# Patient Record
Sex: MALE | Race: WHITE | NOT HISPANIC OR LATINO | Employment: FULL TIME | URBAN - METROPOLITAN AREA
[De-identification: names, ages, dates, MRNs, and addresses within clinical notes are randomized per-mention and may not be internally consistent; named-entity substitution may affect disease eponyms.]

---

## 2023-04-11 ENCOUNTER — HOSPITAL ENCOUNTER (OUTPATIENT)
Dept: DATA CONVERSION | Facility: HOSPITAL | Age: 68
End: 2023-04-11
Attending: ORTHOPAEDIC SURGERY | Admitting: ORTHOPAEDIC SURGERY
Payer: MEDICARE

## 2023-04-11 DIAGNOSIS — E78.5 HYPERLIPIDEMIA, UNSPECIFIED: ICD-10-CM

## 2023-04-11 DIAGNOSIS — G56.21 LESION OF ULNAR NERVE, RIGHT UPPER LIMB: ICD-10-CM

## 2023-04-11 DIAGNOSIS — I10 ESSENTIAL (PRIMARY) HYPERTENSION: ICD-10-CM

## 2023-04-11 DIAGNOSIS — G56.01 CARPAL TUNNEL SYNDROME, RIGHT UPPER LIMB: ICD-10-CM

## 2023-09-07 VITALS — BODY MASS INDEX: 27.76 KG/M2 | HEIGHT: 73 IN | WEIGHT: 209.44 LBS

## 2023-09-14 NOTE — PROGRESS NOTES
Service: Orthopaedics     Subjective Data:   JORDAN BRAY is a 67 year old Male who is Hospital Day # 1.    Objective Data:     Objective Information:    ORTHOPEDIC OPERATIVE NOTE    Name: Jordan Bray  : 53  Surgeon: Bruce Patrick DO  Facility: [facility]  Date of Surgery: 23     SURGEON:     Bruce Patrick DO  PRE OP DIAGNOSIS:   Right Carpal and Cubital Tunnel Syndromes  POST OP DIAGNOSIS:   Same  PROCEDURE:    Right Cubital Tunnel Syndrome Decompression with anterior subcutaneous transposition,  right endoscopic carpal tunnel release   ASSISTANT:     SA Torres  ANESTHESIA:   Axillary block with sedation  COMPLICATIONS:    None  BLOOD LOSS: Minimal    INDICATIONS: The patient is a 67-year-old male with signs, symptoms, and clinical history of EMG nerve conduction study consistent with right carpal and cubital tunnel syndrome. The patient failed conservative management. After discussion with the patient  regarding the diagnosis, the treatment options and associated risks and benefits, informed consent was obtained for the above procedure.    PROCEDURE IN DETAIL: The patient was taken to the operative suite and remained supine on the gurney with the right upper extremity outstretched well-padded on arm table. Next, after adequate sedation was induced, time-out was performed to confirm the  patient, the operative site, and the planned procedure. The extremity was then prepped and draped in usual sterile fashion. It was elevated, exsanguinated with an esmarch, and the upper arm tourniquet was inflated to 250 mmHg.     A 1-cm transverse incision was made to the ulnar aspect of the palmaris longus at proximal wrist crease. Skin and underlying tissues were sharply dissected down. Hemostasis maintained with bipolar electrocauterization. Distally based flap of the fascia  overlying the median nerve was then released, and under direct visualization, proximal fascia was released with Martita  scissors. The elevator and dilator were then placed in the carpal tunnel with appropriate ease of passage and corrugated feel of the  undersurface of transcarpal ligament. The endoscope was then placed under direct visualization. The transverse carpal ligament was released in its entirety, confirmed both visually as well as by utilizing endoscope as a probe, which freely passed following  release. The nerve was evaluated. No evidence of lesion or adhesion was present.    A 4-cm curvilinear medial incision was made with a 15-blade through the skin and full-thickness skin flaps were carefully created down to the level of the fascia. The fascia overlying the cubital tunnel was then incised under loupe magnification visualization  and careful blunt dissection was carried out to the ulnar nerve. The ulnar nerve identified and protected throughout the case, and proximal and distal release was performed distally and released the ulnar nerve after the FCU superficial and deep muscle  fascia and proximally was released up through medial intermuscular septum. Of note, the tunnel was significantly constricted at the level of the cubital tunnel and the medial epicondyle. Once the full release was performed, there were no other constricting  or restricting structures on the cubital tunnel. The elbow was then taken through full range of motion and there was subluxation of the ulnar nerve.  At this point a anterior subcutaneous transposition was performed.  The nerve was freed from its surrounding  tissues using littler's scissors.  The medial intermuscular septum was excised with a scalpel.  A soft tissue envelope was developed with a scalpel anteriorly.  The nerve was transposed anteriorly and the soft tissue flap secured with 4-0 vicryl.  The  arm again taken through a range of motion and found to be without constriction and with good gliding.    The area was then copiously irrigated. Tourniquet was let down. Hemostasis was  achieved with bipolar electrocautery as well as direct pressure. The wound was then closed using 4-0 Vicryl deep dermal sutures and  4-0 nylon running stitch for the skin.  The wound edges were dressed with 4x4s, cast padding, and Ace wrap. The patient was then transferred to the recovery room in stable condition.  PLAN: To be discharged home once cleared with Anesthesia. Follow up in 2  weeks for a wound check. All instrument counts, needle counts, sponge counts  were correct at the end of the case.     Electronically signed  Bruce Patrick DO     Assessment and Plan:   Code Status:  ·  Code Status Full Code     Advance Care Planning:  Advance Care Planning: Patient didn't wish to or  was unable to provide advance care plan       Electronic Signatures:  DAPHNE Patrick James ()  (Signed 11-Apr-2023 11:02)   Authored: Service, Subjective Data, Objective Data, Assessment  and Plan, Note Completion      Last Updated: 11-Apr-2023 11:02 by DAPHNE Patrick James ()

## 2023-09-14 NOTE — H&P
History & Physical Reviewed:   I have reviewed the History and Physical dated:  29-Mar-2023   History and Physical reviewed and relevant findings noted. Patient examined to review pertinent physical  findings.: No significant changes   Home Medications Reviewed: no changes noted   Allergies Reviewed: no changes noted       ERAS (Enhanced Recovery After Surgery):  ·  ERAS Patient: no     Consent:   COVID-19 Consent:  ·  COVID-19 Risk Consent Surgeon has reviewed key risks related to the risk of сергей COVID-19 and if they contract COVID-19 what the risks are.       Electronic Signatures:  DAPHNE Patrick James ()  (Signed 11-Apr-2023 09:28)   Authored: History & Physical Reviewed, ERAS, Consent,  Note Completion      Last Updated: 11-Apr-2023 09:28 by DAPHNE Patrick James ()

## 2023-10-06 ENCOUNTER — OFFICE VISIT (OUTPATIENT)
Dept: PRIMARY CARE | Facility: CLINIC | Age: 68
End: 2023-10-06
Payer: MEDICARE

## 2023-10-06 VITALS
DIASTOLIC BLOOD PRESSURE: 80 MMHG | HEART RATE: 68 BPM | SYSTOLIC BLOOD PRESSURE: 132 MMHG | OXYGEN SATURATION: 97 % | TEMPERATURE: 97.2 F

## 2023-10-06 DIAGNOSIS — H69.93 DYSFUNCTION OF BOTH EUSTACHIAN TUBES: ICD-10-CM

## 2023-10-06 DIAGNOSIS — J01.90 ACUTE NON-RECURRENT SINUSITIS, UNSPECIFIED LOCATION: Primary | ICD-10-CM

## 2023-10-06 PROCEDURE — 1036F TOBACCO NON-USER: CPT | Performed by: PHYSICIAN ASSISTANT

## 2023-10-06 PROCEDURE — 1160F RVW MEDS BY RX/DR IN RCRD: CPT | Performed by: PHYSICIAN ASSISTANT

## 2023-10-06 PROCEDURE — 99214 OFFICE O/P EST MOD 30 MIN: CPT | Performed by: PHYSICIAN ASSISTANT

## 2023-10-06 PROCEDURE — 1159F MED LIST DOCD IN RCRD: CPT | Performed by: PHYSICIAN ASSISTANT

## 2023-10-06 RX ORDER — FLUTICASONE PROPIONATE 50 MCG
2 SPRAY, SUSPENSION (ML) NASAL DAILY
Qty: 16 G | Refills: 0 | Status: SHIPPED | OUTPATIENT
Start: 2023-10-06 | End: 2024-10-05

## 2023-10-06 RX ORDER — AMOXICILLIN 500 MG/1
1000 CAPSULE ORAL 2 TIMES DAILY
Qty: 40 CAPSULE | Refills: 0 | Status: SHIPPED | OUTPATIENT
Start: 2023-10-06 | End: 2023-10-19 | Stop reason: ALTCHOICE

## 2023-10-06 RX ORDER — SIMVASTATIN 20 MG/1
20 TABLET, FILM COATED ORAL NIGHTLY
COMMUNITY
End: 2023-11-01 | Stop reason: SDUPTHER

## 2023-10-06 ASSESSMENT — ENCOUNTER SYMPTOMS
PALPITATIONS: 0
SHORTNESS OF BREATH: 0
ABDOMINAL PAIN: 0

## 2023-10-06 NOTE — PROGRESS NOTES
Subjective   Patient ID: Jordan Bray is a 68 y.o. male who presents for Cough (Sinus congestion, ears aching x 2 wks).    HPI   Patient c/o cough, nasal discharge or sinus pain. Denies fever, shortness of breath and sore throat.  Taking over the counter medications. Present for 2 weeks. Has worsened since onset.   Cough: Cough is productive of greenish sputum.   Nasal discharge: Described as purulent.  Has PND.   Sinus pain: Experiencing frontal pain.   Denies associated diarrhea and vomiting.  Getting some ear pressure.   No fevers/chills or aches.   Patient denies recent known COVID exposure or international travel.     Trying to do nasal lavage and some sudafed.     Hasn't gotten COVID vaccine.      reports that he has never smoked. He has never used smokeless tobacco.    Review of Systems   Respiratory:  Negative for shortness of breath.    Cardiovascular:  Negative for chest pain and palpitations.   Gastrointestinal:  Negative for abdominal pain.       Objective   /80   Pulse 68   Temp 36.2 °C (97.2 °F)   SpO2 97%     Physical Exam  Constitutional:       Appearance: Normal appearance.   HENT:      Head: Normocephalic.      Right Ear: Ear canal normal.      Left Ear: Ear canal normal.      Ears:      Comments: Increased fluid behind TMs without erythema.     Nose: Congestion present.      Right Sinus: Frontal sinus tenderness present.      Left Sinus: Frontal sinus tenderness present.      Mouth/Throat:      Mouth: Mucous membranes are moist.      Pharynx: No oropharyngeal exudate or posterior oropharyngeal erythema.   Eyes:      General: No scleral icterus.  Cardiovascular:      Rate and Rhythm: Normal rate and regular rhythm.   Pulmonary:      Effort: Pulmonary effort is normal.      Breath sounds: Normal breath sounds.   Lymphadenopathy:      Cervical: No cervical adenopathy.   Skin:     General: Skin is warm and dry.   Neurological:      Mental Status: He is alert.         Assessment/Plan    Diagnoses and all orders for this visit:  Acute non-recurrent sinusitis, unspecified location  -     amoxicillin (Amoxil) 500 mg capsule; Take 2 capsules (1,000 mg) by mouth 2 times a day for 10 days.  -     fluticasone (Flonase) 50 mcg/actuation nasal spray; Administer 2 sprays into each nostril once daily.  Dysfunction of both eustachian tubes  -     fluticasone (Flonase) 50 mcg/actuation nasal spray; Administer 2 sprays into each nostril once daily.        Rx Amoxicillin and Flonase.   Can use Mucinex DM.   Encourage fluids and rest.   Follow up if symptoms increase or persist.

## 2023-10-16 ENCOUNTER — TELEPHONE (OUTPATIENT)
Dept: PRIMARY CARE | Facility: CLINIC | Age: 68
End: 2023-10-16
Payer: MEDICARE

## 2023-10-16 NOTE — TELEPHONE ENCOUNTER
Pt called rx line at 1058a requesting a refill on amox, pt is still having earache.    Pt was seen by [facility] on 10/6 and was informed per note to followup if sx's persist.    Please call pt to schedule a follow up appt [facility]

## 2023-10-19 ENCOUNTER — OFFICE VISIT (OUTPATIENT)
Dept: PRIMARY CARE | Facility: CLINIC | Age: 68
End: 2023-10-19
Payer: MEDICARE

## 2023-10-19 VITALS
SYSTOLIC BLOOD PRESSURE: 132 MMHG | OXYGEN SATURATION: 95 % | DIASTOLIC BLOOD PRESSURE: 80 MMHG | HEART RATE: 78 BPM | TEMPERATURE: 97.8 F

## 2023-10-19 DIAGNOSIS — J30.2 SEASONAL ALLERGIC RHINITIS, UNSPECIFIED TRIGGER: Primary | ICD-10-CM

## 2023-10-19 PROBLEM — D48.5 NEOPLASM OF UNCERTAIN BEHAVIOR OF SKIN: Status: ACTIVE | Noted: 2023-06-27

## 2023-10-19 PROBLEM — I10 ESSENTIAL HYPERTENSION: Status: ACTIVE | Noted: 2023-10-19

## 2023-10-19 PROCEDURE — 1160F RVW MEDS BY RX/DR IN RCRD: CPT | Performed by: PHYSICIAN ASSISTANT

## 2023-10-19 PROCEDURE — 3079F DIAST BP 80-89 MM HG: CPT | Performed by: PHYSICIAN ASSISTANT

## 2023-10-19 PROCEDURE — 3075F SYST BP GE 130 - 139MM HG: CPT | Performed by: PHYSICIAN ASSISTANT

## 2023-10-19 PROCEDURE — 1159F MED LIST DOCD IN RCRD: CPT | Performed by: PHYSICIAN ASSISTANT

## 2023-10-19 PROCEDURE — 1036F TOBACCO NON-USER: CPT | Performed by: PHYSICIAN ASSISTANT

## 2023-10-19 PROCEDURE — 99213 OFFICE O/P EST LOW 20 MIN: CPT | Performed by: PHYSICIAN ASSISTANT

## 2023-10-19 NOTE — PROGRESS NOTES
Subjective   Patient ID: Jordan Bray is a 68 y.o. male who presents for Sinusitis (Recheck).    HPI   Patient presents for recheck of sinus symptoms.  Was diagnosed with sinusitis and EGD last visit on 10/6/2023.  Was placed on amoxicillin.  Finished the amoxicillin a few days ago and feeling much improved but still was having a little bit of postnasal drainage past couple days but that does seem to be better today.  Ears are also feeling better.  Used Flonase as well but no longer taking it.  States he is frustrated with recurrent sinus issues that he gets different times of the year. Worse in spring and fall and some in the winter.   Get sneezy at times and mild itchy nose. Gets runny nose.   Has dealt with this for years.    Review of Systems    Objective   /80   Pulse 78   Temp 36.6 °C (97.8 °F)   SpO2 95%     Physical Exam  Vitals and nursing note reviewed.   Constitutional:       Appearance: Normal appearance.   HENT:      Head: Normocephalic.      Right Ear: Tympanic membrane, ear canal and external ear normal.      Left Ear: Tympanic membrane, ear canal and external ear normal.      Nose: No congestion.      Right Sinus: No maxillary sinus tenderness or frontal sinus tenderness.      Left Sinus: No maxillary sinus tenderness or frontal sinus tenderness.      Mouth/Throat:      Mouth: Mucous membranes are moist.      Pharynx: No oropharyngeal exudate or posterior oropharyngeal erythema.   Eyes:      General: No scleral icterus.  Cardiovascular:      Rate and Rhythm: Normal rate and regular rhythm.   Pulmonary:      Effort: Pulmonary effort is normal.      Breath sounds: Normal breath sounds.   Lymphadenopathy:      Cervical: No cervical adenopathy.   Skin:     General: Skin is warm and dry.   Neurological:      Mental Status: He is alert.       Assessment/Plan   Diagnoses and all orders for this visit:  Seasonal allergic rhinitis, unspecified trigger  -     Referral to Allergy; Future     Suspect  underlying seasonal allergies.  Recommended that he use OTC Claritin or Zyrtec and can even add on Flonase OTC as needed.  If not improving can schedule with allergist for further evaluation and did referral for this.  Follow-up if symptoms significant increase or persist.

## 2023-10-26 ENCOUNTER — LAB (OUTPATIENT)
Dept: LAB | Facility: LAB | Age: 68
End: 2023-10-26
Payer: MEDICARE

## 2023-10-26 DIAGNOSIS — E78.5 HYPERLIPIDEMIA, UNSPECIFIED: Primary | ICD-10-CM

## 2023-10-26 DIAGNOSIS — N40.1 BENIGN PROSTATIC HYPERPLASIA WITH LOWER URINARY TRACT SYMPTOMS: ICD-10-CM

## 2023-10-26 LAB
ALBUMIN SERPL BCP-MCNC: 4.1 G/DL (ref 3.4–5)
ALP SERPL-CCNC: 77 U/L (ref 33–136)
ALT SERPL W P-5'-P-CCNC: 26 U/L (ref 10–52)
ANION GAP SERPL CALC-SCNC: 9 MMOL/L (ref 10–20)
AST SERPL W P-5'-P-CCNC: 31 U/L (ref 9–39)
BILIRUB SERPL-MCNC: 0.5 MG/DL (ref 0–1.2)
BUN SERPL-MCNC: 28 MG/DL (ref 6–23)
CALCIUM SERPL-MCNC: 9.3 MG/DL (ref 8.6–10.3)
CHLORIDE SERPL-SCNC: 109 MMOL/L (ref 98–107)
CHOLEST SERPL-MCNC: 137 MG/DL (ref 0–199)
CHOLESTEROL/HDL RATIO: 2.6
CO2 SERPL-SCNC: 26 MMOL/L (ref 21–32)
CREAT SERPL-MCNC: 0.86 MG/DL (ref 0.5–1.3)
GFR SERPL CREATININE-BSD FRML MDRD: >90 ML/MIN/1.73M*2
GLUCOSE SERPL-MCNC: 109 MG/DL (ref 74–99)
HDLC SERPL-MCNC: 53 MG/DL
LDLC SERPL CALC-MCNC: 70 MG/DL
NON HDL CHOLESTEROL: 84 MG/DL (ref 0–149)
POTASSIUM SERPL-SCNC: 4.7 MMOL/L (ref 3.5–5.3)
PROT SERPL-MCNC: 6 G/DL (ref 6.4–8.2)
PSA SERPL-MCNC: 0.54 NG/ML
SODIUM SERPL-SCNC: 139 MMOL/L (ref 136–145)
TRIGL SERPL-MCNC: 69 MG/DL (ref 0–149)
VLDL: 14 MG/DL (ref 0–40)

## 2023-10-26 PROCEDURE — 80061 LIPID PANEL: CPT

## 2023-10-26 PROCEDURE — 36415 COLL VENOUS BLD VENIPUNCTURE: CPT

## 2023-10-26 PROCEDURE — 84153 ASSAY OF PSA TOTAL: CPT

## 2023-10-26 PROCEDURE — 80053 COMPREHEN METABOLIC PANEL: CPT

## 2023-11-01 ENCOUNTER — OFFICE VISIT (OUTPATIENT)
Dept: PRIMARY CARE | Facility: CLINIC | Age: 68
End: 2023-11-01
Payer: MEDICARE

## 2023-11-01 VITALS
HEIGHT: 72 IN | BODY MASS INDEX: 28.71 KG/M2 | SYSTOLIC BLOOD PRESSURE: 126 MMHG | DIASTOLIC BLOOD PRESSURE: 78 MMHG | WEIGHT: 212 LBS | OXYGEN SATURATION: 96 % | TEMPERATURE: 97.2 F | HEART RATE: 70 BPM

## 2023-11-01 DIAGNOSIS — Z12.5 SCREENING FOR PROSTATE CANCER: ICD-10-CM

## 2023-11-01 DIAGNOSIS — Z00.00 ROUTINE GENERAL MEDICAL EXAMINATION AT HEALTH CARE FACILITY: Primary | ICD-10-CM

## 2023-11-01 DIAGNOSIS — H69.92 DYSFUNCTION OF LEFT EUSTACHIAN TUBE: ICD-10-CM

## 2023-11-01 DIAGNOSIS — R73.01 IFG (IMPAIRED FASTING GLUCOSE): ICD-10-CM

## 2023-11-01 DIAGNOSIS — E78.2 MIXED HYPERLIPIDEMIA: ICD-10-CM

## 2023-11-01 PROCEDURE — G0439 PPPS, SUBSEQ VISIT: HCPCS | Performed by: FAMILY MEDICINE

## 2023-11-01 PROCEDURE — 1170F FXNL STATUS ASSESSED: CPT | Performed by: FAMILY MEDICINE

## 2023-11-01 PROCEDURE — 1160F RVW MEDS BY RX/DR IN RCRD: CPT | Performed by: FAMILY MEDICINE

## 2023-11-01 PROCEDURE — 1159F MED LIST DOCD IN RCRD: CPT | Performed by: FAMILY MEDICINE

## 2023-11-01 PROCEDURE — 99213 OFFICE O/P EST LOW 20 MIN: CPT | Performed by: FAMILY MEDICINE

## 2023-11-01 PROCEDURE — 3078F DIAST BP <80 MM HG: CPT | Performed by: FAMILY MEDICINE

## 2023-11-01 PROCEDURE — 1036F TOBACCO NON-USER: CPT | Performed by: FAMILY MEDICINE

## 2023-11-01 PROCEDURE — 3074F SYST BP LT 130 MM HG: CPT | Performed by: FAMILY MEDICINE

## 2023-11-01 RX ORDER — AZELASTINE 1 MG/ML
2 SPRAY, METERED NASAL 2 TIMES DAILY
Qty: 30 ML | Refills: 1 | Status: SHIPPED | OUTPATIENT
Start: 2023-11-01

## 2023-11-01 RX ORDER — SIMVASTATIN 20 MG/1
20 TABLET, FILM COATED ORAL NIGHTLY
Qty: 90 TABLET | Refills: 3 | Status: SHIPPED | OUTPATIENT
Start: 2023-11-01

## 2023-11-01 ASSESSMENT — PATIENT HEALTH QUESTIONNAIRE - PHQ9
1. LITTLE INTEREST OR PLEASURE IN DOING THINGS: NOT AT ALL
SUM OF ALL RESPONSES TO PHQ9 QUESTIONS 1 AND 2: 0
2. FEELING DOWN, DEPRESSED OR HOPELESS: NOT AT ALL

## 2023-11-01 ASSESSMENT — ACTIVITIES OF DAILY LIVING (ADL)
GROCERY_SHOPPING: INDEPENDENT
DOING_HOUSEWORK: INDEPENDENT
BATHING: INDEPENDENT
DRESSING: INDEPENDENT
TAKING_MEDICATION: INDEPENDENT
MANAGING_FINANCES: INDEPENDENT

## 2023-11-01 NOTE — PROGRESS NOTES
Subjective   Reason for Visit: Jordan Bray is an 68 y.o. male here for a Medicare Wellness visit.          Reviewed all medications by prescribing practitioner or clinical pharmacist (such as prescriptions, OTCs, herbal therapies and supplements) and documented in the medical record.    HPI    HPI: Annual Wellness visit. The patient has not felt depressed over the past 6 weeks. No trouble with bathing, dressing, eating, grooming, walking, toileting, house work, managing money or transportation. Has fallen 1-2 times. The home has functioning smoke alarms, grab bars in the bathroom and handrails on the stairs. The home does not have poor lighting. Denies hearing difficulty in the ears bilaterally. No diet restrictions.     Sinusitis: congestion, LGF, cough this week, started Friday.      lipids: well controlled on simvastatin.    Preparing meals - independent  Using telephone - independent  Bladder - continent  Bowel - continent    Recent hospital stays -     ADLs - able to dress, walk and bath independently  Instrumental ADL's - able to shop, maintain finances, managing medications, housekeeping independently    Depression: PHQ-2 (screening 2 mins) - negative    Opioid use -   none  Exercise -  regularly  Diet - well balanced  Pain score - none    Providers    [employer] dementia screen - 5/5    Education - educated pt on healthy eating, exercise, weight loss    Falls - 0        MiniCO/5    Counseled pt on living will: printed out sheet    AAA screening:  NA     Prostate CA screen:  PSA was normal     CV screening: stress negative last year    Colonoscopy: due     Diabetes screening:  normal    Glaucoma screen:  sees optho regularly    CT chest tob screen: NA    Vaccines:  giving flu shot and pneumonia shot    Patient Care Team:  Aldo Connolly MD as PCP - General  Aldo Connolly MD as PCP - [facility] Attributed [facility]     Review of Systems   All other systems reviewed and are negative.      Objective    Vitals:  /78   Pulse 70   Temp 36.2 °C (97.2 °F)   Ht 1.829 m (6')   Wt 96.2 kg (212 lb)   SpO2 96%   BMI 28.75 kg/m²       Physical Exam  Vitals reviewed.   Constitutional:       General: He is not in acute distress.     Appearance: Normal appearance. He is well-developed. He is not diaphoretic.   HENT:      Head: Normocephalic and atraumatic.      Right Ear: Tympanic membrane normal.      Left Ear: Tympanic membrane normal.      Nose: Nose normal.      Mouth/Throat:      Mouth: Mucous membranes are moist.   Eyes:      Pupils: Pupils are equal, round, and reactive to light.   Cardiovascular:      Rate and Rhythm: Normal rate and regular rhythm.      Heart sounds: Normal heart sounds. No murmur heard.     No friction rub. No gallop.   Pulmonary:      Effort: Pulmonary effort is normal.      Breath sounds: Normal breath sounds. No rales.   Abdominal:      General: Bowel sounds are normal.      Palpations: Abdomen is soft.      Tenderness: There is no abdominal tenderness.   Musculoskeletal:      Cervical back: Normal range of motion and neck supple.   Skin:     General: Skin is warm and dry.   Neurological:      Mental Status: He is alert.   Psychiatric:         Mood and Affect: Mood normal.         Assessment/Plan   Problem List Items Addressed This Visit       Hyperlipidemia    Overview     Note: ak         Relevant Medications    simvastatin (Zocor) 20 mg tablet     Other Visit Diagnoses       Routine general medical examination at health care facility    -  Primary    IFG (impaired fasting glucose)        Relevant Orders    Hemoglobin A1C    Dysfunction of left eustachian tube        Relevant Medications    azelastine (Astelin) 137 mcg (0.1 %) nasal spray            Try astelin and flonase for ETD.  Checking A1c to look at BS.  Aim for 10-15 lbs of weight loss.  Fu in 12 mo

## 2023-11-02 ENCOUNTER — APPOINTMENT (OUTPATIENT)
Dept: LAB | Facility: LAB | Age: 68
End: 2023-11-02
Payer: MEDICARE

## 2023-11-09 ENCOUNTER — LAB (OUTPATIENT)
Dept: LAB | Facility: LAB | Age: 68
End: 2023-11-09
Payer: MEDICARE

## 2023-11-09 ENCOUNTER — TELEPHONE (OUTPATIENT)
Dept: PRIMARY CARE | Facility: CLINIC | Age: 68
End: 2023-11-09

## 2023-11-09 DIAGNOSIS — R73.01 IFG (IMPAIRED FASTING GLUCOSE): ICD-10-CM

## 2023-11-09 LAB
EST. AVERAGE GLUCOSE BLD GHB EST-MCNC: 123 MG/DL
HBA1C MFR BLD: 5.9 %

## 2023-11-09 PROCEDURE — 36415 COLL VENOUS BLD VENIPUNCTURE: CPT

## 2023-11-09 PROCEDURE — 83036 HEMOGLOBIN GLYCOSYLATED A1C: CPT

## 2023-11-09 NOTE — TELEPHONE ENCOUNTER
----- Message from Aldo Connolly MD sent at 11/9/2023  1:17 PM EST -----  Patient's A1c was 5.9.  Cutoff for diabetes is 6.5 so he is prediabetic

## 2024-08-22 ENCOUNTER — APPOINTMENT (OUTPATIENT)
Dept: PRIMARY CARE | Facility: CLINIC | Age: 69
End: 2024-08-22
Payer: MEDICARE

## 2024-08-22 VITALS
BODY MASS INDEX: 29.43 KG/M2 | SYSTOLIC BLOOD PRESSURE: 122 MMHG | TEMPERATURE: 97.7 F | HEART RATE: 69 BPM | WEIGHT: 217 LBS | OXYGEN SATURATION: 98 % | DIASTOLIC BLOOD PRESSURE: 82 MMHG

## 2024-08-22 DIAGNOSIS — G89.29 CHRONIC PAIN OF BOTH KNEES: Primary | ICD-10-CM

## 2024-08-22 DIAGNOSIS — M54.50 BILATERAL LOW BACK PAIN, UNSPECIFIED CHRONICITY, UNSPECIFIED WHETHER SCIATICA PRESENT: ICD-10-CM

## 2024-08-22 DIAGNOSIS — M25.562 CHRONIC PAIN OF BOTH KNEES: Primary | ICD-10-CM

## 2024-08-22 DIAGNOSIS — M25.561 CHRONIC PAIN OF BOTH KNEES: Primary | ICD-10-CM

## 2024-08-22 PROCEDURE — 99214 OFFICE O/P EST MOD 30 MIN: CPT | Performed by: FAMILY MEDICINE

## 2024-08-22 PROCEDURE — 3074F SYST BP LT 130 MM HG: CPT | Performed by: FAMILY MEDICINE

## 2024-08-22 PROCEDURE — 3079F DIAST BP 80-89 MM HG: CPT | Performed by: FAMILY MEDICINE

## 2024-08-22 RX ORDER — TIMOLOL MALEATE 5 MG/ML
1 SOLUTION/ DROPS OPHTHALMIC DAILY
COMMUNITY
Start: 2024-05-23

## 2024-08-22 RX ORDER — NORTRIPTYLINE HYDROCHLORIDE 25 MG/1
25 CAPSULE ORAL NIGHTLY
Qty: 90 CAPSULE | Refills: 1 | Status: SHIPPED | OUTPATIENT
Start: 2024-08-22 | End: 2025-02-18

## 2024-08-22 ASSESSMENT — ENCOUNTER SYMPTOMS: LEG PAIN: 1

## 2024-08-22 NOTE — PROGRESS NOTES
Subjective   Patient ID: Jordan Bray is a 69 y.o. male who presents for Leg Pain (Discuss tight feeling in bilat legs, R knee pain, low back pain x12 years- getting progressively worse. ).  Leg Pain      patient presents with pain in the back of his knees particularly in his right for some time now.  He had some trauma to his leg around 2012.  Now it is difficult for her to walk around and stand up from sitting.  He is not interested in knee replacement.  He also has been having pain in his lower back which has been getting worse over the past 12 years.    Patient Active Problem List   Diagnosis    Essential hypertension    Hyperlipidemia    Neoplasm of uncertain behavior of skin    Obstructive sleep apnea syndrome       Social Connections: Not on file       Current Outpatient Medications on File Prior to Visit   Medication Sig Dispense Refill    azelastine (Astelin) 137 mcg (0.1 %) nasal spray Administer 2 sprays into each nostril 2 times a day. Use in each nostril as directed 30 mL 1    fluticasone (Flonase) 50 mcg/actuation nasal spray Administer 2 sprays into each nostril once daily. 16 g 0    simvastatin (Zocor) 20 mg tablet Take 1 tablet (20 mg) by mouth once daily at bedtime. 90 tablet 3    timolol (Timoptic) 0.5 % ophthalmic solution Administer 1 drop into the right eye once daily.       No current facility-administered medications on file prior to visit.        Vitals:    08/22/24 0837   BP: 122/82   Pulse: 69   Temp: 36.5 °C (97.7 °F)   SpO2: 98%     Vitals:    08/22/24 0837   Weight: 98.4 kg (217 lb)       Review of Systems   All other systems reviewed and are negative.      Objective     Physical Exam  Constitutional:       Appearance: Normal appearance. He is well-developed.   HENT:      Head: Atraumatic.   Cardiovascular:      Rate and Rhythm: Normal rate and regular rhythm.      Heart sounds: Normal heart sounds. No murmur heard.  Pulmonary:      Effort: Pulmonary effort is normal.      Breath  sounds: Normal breath sounds.   Abdominal:      General: Bowel sounds are normal.      Palpations: Abdomen is soft.   Musculoskeletal:      Comments: Positive Thessaly test on left knee.  Tenderness to palpation at the posterior knee bilaterally.   Skin:     General: Skin is warm.   Neurological:      General: No focal deficit present.      Mental Status: He is alert.   Psychiatric:         Mood and Affect: Mood normal.         No visits with results within 2 Month(s) from this visit.   Latest known visit with results is:   Lab on 11/09/2023   Component Date Value Ref Range Status    Hemoglobin A1C 11/09/2023 5.9 (H)  see below % Final    Estimated Average Glucose 11/09/2023 123  Not Established mg/dL Final       Assessment/Plan   Problem List Items Addressed This Visit    None  Visit Diagnoses         Codes    Chronic pain of both knees    -  Primary M25.561, M25.562, G89.29    Relevant Medications    nortriptyline (Pamelor) 25 mg capsule    Other Relevant Orders    XR knee 4+ views bilateral    Referral to Physical Therapy    MR knee left wo IV contrast    Bilateral low back pain, unspecified chronicity, unspecified whether sciatica present     M54.50    Relevant Medications    nortriptyline (Pamelor) 25 mg capsule    Other Relevant Orders    Referral to Physical Therapy        Ordering physical therapy for patient.  Ordering x-rays and MRI of left knee.

## 2024-08-26 ENCOUNTER — HOSPITAL ENCOUNTER (OUTPATIENT)
Dept: RADIOLOGY | Facility: CLINIC | Age: 69
Discharge: HOME | End: 2024-08-26
Payer: MEDICARE

## 2024-08-26 DIAGNOSIS — M25.562 CHRONIC PAIN OF BOTH KNEES: ICD-10-CM

## 2024-08-26 DIAGNOSIS — G89.29 CHRONIC PAIN OF BOTH KNEES: ICD-10-CM

## 2024-08-26 DIAGNOSIS — S83.249A ACUTE MEDIAL MENISCAL TEAR, UNSPECIFIED LATERALITY, INITIAL ENCOUNTER: Primary | ICD-10-CM

## 2024-08-26 DIAGNOSIS — M25.561 CHRONIC PAIN OF BOTH KNEES: ICD-10-CM

## 2024-08-26 PROCEDURE — 73564 X-RAY EXAM KNEE 4 OR MORE: CPT | Mod: 50

## 2024-08-26 PROCEDURE — 73721 MRI JNT OF LWR EXTRE W/O DYE: CPT | Mod: LEFT SIDE | Performed by: RADIOLOGY

## 2024-08-26 PROCEDURE — 73564 X-RAY EXAM KNEE 4 OR MORE: CPT | Mod: BILATERAL PROCEDURE | Performed by: RADIOLOGY

## 2024-08-26 PROCEDURE — 73721 MRI JNT OF LWR EXTRE W/O DYE: CPT | Mod: LT

## 2024-08-27 ENCOUNTER — TELEPHONE (OUTPATIENT)
Dept: PRIMARY CARE | Facility: CLINIC | Age: 69
End: 2024-08-27
Payer: MEDICARE

## 2024-08-27 NOTE — RESULT ENCOUNTER NOTE
Please let pt know his MRI showed a complex meniscus tear and possibly some ACL tear as well.  I'm going to refer him to Dr. Hernández to help to take care of him.

## 2024-08-27 NOTE — TELEPHONE ENCOUNTER
----- Message from Aldo Connolly sent at 8/26/2024 10:02 PM EDT -----  Please let pt know his MRI showed a complex meniscus tear and possibly some ACL tear as well.  I'm going to refer him to Dr. Hernández to help to take care of him.

## 2024-08-29 ENCOUNTER — TELEPHONE (OUTPATIENT)
Dept: PRIMARY CARE | Facility: CLINIC | Age: 69
End: 2024-08-29
Payer: MEDICARE

## 2024-08-29 NOTE — TELEPHONE ENCOUNTER
----- Message from Aldo Connolly sent at 8/29/2024  8:36 AM EDT -----  Xray shows B severe knee OA.  I like Dr. Hernández for replacements

## 2024-09-10 ENCOUNTER — APPOINTMENT (OUTPATIENT)
Dept: ORTHOPEDIC SURGERY | Facility: CLINIC | Age: 69
End: 2024-09-10
Payer: MEDICARE

## 2024-09-10 VITALS — WEIGHT: 217 LBS | HEIGHT: 72 IN | BODY MASS INDEX: 29.39 KG/M2

## 2024-09-10 DIAGNOSIS — S83.249A ACUTE MEDIAL MENISCAL TEAR, UNSPECIFIED LATERALITY, INITIAL ENCOUNTER: ICD-10-CM

## 2024-09-10 DIAGNOSIS — M17.0 PRIMARY OSTEOARTHRITIS OF BOTH KNEES: ICD-10-CM

## 2024-09-10 PROCEDURE — 1159F MED LIST DOCD IN RCRD: CPT | Performed by: ORTHOPAEDIC SURGERY

## 2024-09-10 PROCEDURE — 20610 DRAIN/INJ JOINT/BURSA W/O US: CPT | Performed by: ORTHOPAEDIC SURGERY

## 2024-09-10 PROCEDURE — 1036F TOBACCO NON-USER: CPT | Performed by: ORTHOPAEDIC SURGERY

## 2024-09-10 PROCEDURE — 99214 OFFICE O/P EST MOD 30 MIN: CPT | Performed by: ORTHOPAEDIC SURGERY

## 2024-09-10 PROCEDURE — 3008F BODY MASS INDEX DOCD: CPT | Performed by: ORTHOPAEDIC SURGERY

## 2024-09-10 RX ORDER — TRIAMCINOLONE ACETONIDE 40 MG/ML
80 INJECTION, SUSPENSION INTRA-ARTICULAR; INTRAMUSCULAR
Status: COMPLETED | OUTPATIENT
Start: 2024-09-10 | End: 2024-09-10

## 2024-09-10 ASSESSMENT — PAIN - FUNCTIONAL ASSESSMENT: PAIN_FUNCTIONAL_ASSESSMENT: 0-10

## 2024-09-10 ASSESSMENT — PAIN SCALES - GENERAL: PAINLEVEL_OUTOF10: 0 - NO PAIN
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
## 2024-09-13 ENCOUNTER — EVALUATION (OUTPATIENT)
Dept: PHYSICAL THERAPY | Facility: CLINIC | Age: 69
End: 2024-09-13
Payer: MEDICARE

## 2024-09-13 DIAGNOSIS — M17.0 PRIMARY OSTEOARTHRITIS OF BOTH KNEES: Primary | ICD-10-CM

## 2024-09-13 PROCEDURE — 97110 THERAPEUTIC EXERCISES: CPT | Mod: GP | Performed by: PHYSICAL THERAPIST

## 2024-09-13 PROCEDURE — 97161 PT EVAL LOW COMPLEX 20 MIN: CPT | Mod: GP | Performed by: PHYSICAL THERAPIST

## 2024-09-13 ASSESSMENT — PAIN SCALES - GENERAL: PAINLEVEL_OUTOF10: 1

## 2024-09-13 ASSESSMENT — PATIENT HEALTH QUESTIONNAIRE - PHQ9
SUM OF ALL RESPONSES TO PHQ9 QUESTIONS 1 AND 2: 0
1. LITTLE INTEREST OR PLEASURE IN DOING THINGS: NOT AT ALL
2. FEELING DOWN, DEPRESSED OR HOPELESS: NOT AT ALL

## 2024-09-13 ASSESSMENT — ENCOUNTER SYMPTOMS
LOSS OF SENSATION IN FEET: 0
DEPRESSION: 0
OCCASIONAL FEELINGS OF UNSTEADINESS: 0

## 2024-09-13 ASSESSMENT — PAIN - FUNCTIONAL ASSESSMENT: PAIN_FUNCTIONAL_ASSESSMENT: 0-10

## 2024-09-13 NOTE — PROGRESS NOTES
Physical Therapy    Physical Therapy Lower Extremity Evaluation    Patient Name: Jordan Bray  MRN: [id number]  : 1955   Today's Date: 2024  Time Calculation  Start Time: 0200  Stop Time: 0245  Time Calculation (min): 45 min  PT Evaluation Time Entry  PT Evaluation (Low) Time Entry: 25  PT Therapeutic Procedures Time Entry  Therapeutic Exercise Time Entry: 10        Non-Billable Time  Non-billable time: 10  Non-billable time reason: CP    Visit: 1  Auth: MN    Current Problem  Problem List Items Addressed This Visit             ICD-10-CM    Primary osteoarthritis of both knees - Primary M17.0    Relevant Orders    Follow Up In Physical Therapy        General  Name and  confirmed with patient on this date.           Precautions  Precautions  STEADI Fall Risk Score (The score of 4 or more indicates an increased risk of falling): 0  Precautions Comment: none       Pain  Pain Assessment: 0-10  0-10 (Numeric) Pain Score: 1  Pain Location: Knee  Pain Orientation:  (bilat.)    SUBJECTIVE:   Chief complaint:  Pt is a 70 yo male with complaint of bilat knee pain over the past 2+ years, right > left. Pt reports that in  he was at work and jumped off of a ladder and landed with his legs straight. Pt had cortisone injection in bilat knees on 9/10/24.Pt reports that he received significant relief from injections.    Pain Better: tumeric  Pain Worse: twisting knee, prolonged walking and standing, stairs,squatting, kneeling  Imaging: severe right knee PF arthritis and moderate arthritis in medial compartment. Left knee with severe medial compartment arthritis.  Prior level of function:   Current limitations:   Home setup: one story with basement  Work: Retired  Patients goal: Help knees move better    OBJECTIVE:    Lower Extremity Strength: (5/5 unless noted)  MMT RIGHT LEFT   Hip Flexion     Hip Extension     Hip Abduction     Hip Adduction     Knee Extension 4+/5 4+/5   Knee Flexion 4+/5 4+/5   Ankle DF    "  Ankle PF     Ankle INV     Ankle EV       Lower Extremity ROM: WNL unless documented below:  ROM in Degrees  RIGHT LEFT   Hip Flexion     Hip Extension     Hip Abduction     Hip Adduction     Knee Extension -12 from neutral -10 from neutral   Knee Flexion 110 110   Ankle DF     Ankle PF     Ankle INV     Ankle EV       Joint mobility: decreased patellar mobility bilat  Posture: maintains bilat knees in flexed posture  Gait mechanics: Pt ambulates with bilat knees in flexed position throughout gait.  Palpation: tender around patella and posterior knee bilat  Neurological symptoms: none    Special tests:  KNEE RIGHT LEFT   Mckenna's positive positive   Apley's     Joint Line Tenderness RIGHT LEFT   Anterior Drawer     Posterior Drawer     Valgus stress     Varus Stress         Functional Outcome:   Other Measures  Lower Extremity Funtional Score (LEFS): 24    TREATMENT:  EXERCISES Date 9/13/24 Date Date Date    REPS REPS REPS REPS          Nustep L4     8 mins      Quad set 5\"H X 10      Heel slide 10X      Shuttle  DLP 5B  3 X 10      Shuttle SLP 3B  3 X 10      Shuttle TR/HR              Qhip Flexion       Qhip Extension       Qhip Abduction       Qhip  TKE              Q Quad       Q Hamstring                                                                                                   CP bilat knees 8 mins         Access Code: [email or web address]  URL: [email or web address]  Date: 09/13/2024  Prepared by: Norma Meyer    Exercises  - Supine Quad Set  - 1 x daily - 7 x weekly - 1-2 sets - 10 reps  - Supine Heel Slide  - 1 x daily - 7 x weekly - 1 sets - 10 reps  - Seated Heel Slide  - 1 x daily - 7 x weekly - 1 sets - 10 reps  - Clamshell with Resistance  - 1 x daily - 7 x weekly - 1-2 sets - 10 reps    ASSESSEMENT  Pt is a 69 y.o. referred to physical therapy for a dx of OA bilat knees by Katiuska Hernández DO . Pt presents with bilat knee pain, decreased knee ROM and decreased stregnth and altered " gait. This pt would benefit from a therapy program to restore prior level of function, reduce pain, increase AROM, increase strength, and improve gait and balance.     The physical therapy prognosis is good for the patient to achieve their goals.   The pt tolerated therapy treatment today well with no adverse effects.  Barriers to therapy include:  severe OA bilat knees    PLAN  The pt will be seen 2 days a week for 4-8 weeks.      The pt has been educated about the risks and benefits of physical therapy and gives consent for treatment.     The patient will benefit from physical therapy treatment to include:  bilat knee ROM, strengthening, proprioception and gait training    Goals:  Improve knee ROM by at least 10 degrees to help normalize gait pattern-4 weeks  Pt will ambulate with minimal deviation without device-4 weeks  Pt will have sufficient knee ROM so that he can don/doff shoes and socks with minimal difficulty-4 weeks  Pt will be able to get into/out of car with minimal difficulty-6-8 weeks  Improve LEFS score >45 to facilitate return to prior level of function-6-8 weeks

## 2024-09-16 ENCOUNTER — TREATMENT (OUTPATIENT)
Dept: PHYSICAL THERAPY | Facility: CLINIC | Age: 69
End: 2024-09-16
Payer: MEDICARE

## 2024-09-16 DIAGNOSIS — M17.0 PRIMARY OSTEOARTHRITIS OF BOTH KNEES: ICD-10-CM

## 2024-09-16 PROCEDURE — 97110 THERAPEUTIC EXERCISES: CPT | Mod: GP | Performed by: PHYSICAL THERAPIST

## 2024-09-16 ASSESSMENT — PAIN - FUNCTIONAL ASSESSMENT: PAIN_FUNCTIONAL_ASSESSMENT: 0-10

## 2024-09-16 ASSESSMENT — PAIN SCALES - GENERAL: PAINLEVEL_OUTOF10: 0 - NO PAIN

## 2024-09-16 NOTE — PROGRESS NOTES
"Physical Therapy    Physical Therapy Treatment    Patient Name: Jordan Bray  MRN: [id number]  : 1955   Today's Date: 2024  Time Calculation  Start Time: 1215  Stop Time: 1308  Time Calculation (min): 53 min     PT Therapeutic Procedures Time Entry  Therapeutic Exercise Time Entry: 43        Non-Billable Time  Non-billable time: 10  Non-billable time reason: CP    Visit: 2  Auth: MN    Current Problem  Problem List Items Addressed This Visit             ICD-10-CM    Primary osteoarthritis of both knees M17.0          General  Name and  confirmed with patient on this date.           Precautions  Precautions  Precautions Comment: none       Pain  Pain Assessment: 0-10  0-10 (Numeric) Pain Score: 0 - No pain    SUBJECTIVE:   Pt reports that his knees have been feeling good. No pain today, just stiffness.    OBJECTIVE:    Lower Extremity Strength: (5/5 unless noted)  MMT RIGHT LEFT   Hip Flexion     Hip Extension     Hip Abduction     Hip Adduction     Knee Extension 4+/5 4+/5   Knee Flexion 4+/5 4+/5   Ankle DF     Ankle PF     Ankle INV     Ankle EV       Lower Extremity ROM: WNL unless documented below:  ROM in Degrees  RIGHT LEFT   Hip Flexion     Hip Extension     Hip Abduction     Hip Adduction     Knee Extension -12 from neutral -10 from neutral   Knee Flexion 110 110   Ankle DF     Ankle PF     Ankle INV     Ankle EV       Functional Outcome:        TREATMENT:  EXERCISES Date 24 Date 24 Date Date    REPS REPS REPS REPS          Nustep L4     8 mins L5  10 mins     Quad set 5\"H X 10      Heel slide 10X      Shuttle  DLP 5B  3 X 10 5B  3 X 10     Shuttle SLP 3B  3 X 10 3B  3 X 10     Shuttle TR/HR  5B  3 X 10            Qhip Flexion  40#  2 X 10     Qhip Extension       Qhip Abduction  40#  2 X 10     Qhip  TKE              Q Quad       Q Hamstring   25#  3 X 10            Seated hamstring stretch  10\"H  X 30 ea leg                                                                         "          CP bilat knees 8 mins 10  mins        Access Code: [email or web address]  URL: [email or web address]  Date: 09/13/2024  Prepared by: Norma Meyer    Exercises  - Supine Quad Set  - 1 x daily - 7 x weekly - 1-2 sets - 10 reps  - Supine Heel Slide  - 1 x daily - 7 x weekly - 1 sets - 10 reps  - Seated Heel Slide  - 1 x daily - 7 x weekly - 1 sets - 10 reps  - Clamshell with Resistance  - 1 x daily - 7 x weekly - 1-2 sets - 10 reps    ASSESSMENT  Good tolerance to progression of strengthening exercises. Pt has difficulty with end range extension of bilat knees.    PLAN  The pt will be seen 2 days a week for 4-8 weeks.      The pt has been educated about the risks and benefits of physical therapy and gives consent for treatment.     The patient will benefit from physical therapy treatment to include:  bilat knee ROM, strengthening, proprioception and gait training    Goals:  Improve knee ROM by at least 10 degrees to help normalize gait pattern-4 weeks  Pt will ambulate with minimal deviation without device-4 weeks  Pt will have sufficient knee ROM so that he can don/doff shoes and socks with minimal difficulty-4 weeks  Pt will be able to get into/out of car with minimal difficulty-6-8 weeks  Improve LEFS score >45 to facilitate return to prior level of function-6-8 weeks

## 2024-09-20 ENCOUNTER — TREATMENT (OUTPATIENT)
Dept: PHYSICAL THERAPY | Facility: CLINIC | Age: 69
End: 2024-09-20
Payer: MEDICARE

## 2024-09-20 DIAGNOSIS — M17.0 PRIMARY OSTEOARTHRITIS OF BOTH KNEES: ICD-10-CM

## 2024-09-20 PROCEDURE — 97110 THERAPEUTIC EXERCISES: CPT | Mod: GP,CQ

## 2024-09-20 ASSESSMENT — PAIN SCALES - GENERAL: PAINLEVEL_OUTOF10: 0 - NO PAIN

## 2024-09-20 ASSESSMENT — PAIN - FUNCTIONAL ASSESSMENT: PAIN_FUNCTIONAL_ASSESSMENT: 0-10

## 2024-09-20 NOTE — PROGRESS NOTES
"Physical Therapy    Physical Therapy Treatment    Patient Name: Jordan Bray  MRN: [id number]  : 1955   Today's Date: 2024  Time Calculation  Start Time: 845  Stop Time: 936  Time Calculation (min): 51 min     PT Therapeutic Procedures Time Entry  Therapeutic Exercise Time Entry: 41  PT Modalities Time Entry  Hot/Cold Pack Time Entry: 10          Visit: 3  Auth: MN    Current Problem  Problem List Items Addressed This Visit             ICD-10-CM    Primary osteoarthritis of both knees M17.0          Precautions   Precautions  Precautions Comment: none       Pain  Pain Assessment: 0-10  0-10 (Numeric) Pain Score: 0 - No pain  Pain Location: Knee  Pain Orientation: Right, Left    SUBJECTIVE:   Pt reports decreased bilateral knee pain and stiffness since beginning PT.      OBJECTIVE:    Lower Extremity Strength: (5/5 unless noted)  MMT RIGHT LEFT   Hip Flexion     Hip Extension     Hip Abduction     Hip Adduction     Knee Extension 4+/5 4+/5   Knee Flexion 4+/5 4+/5   Ankle DF     Ankle PF     Ankle INV     Ankle EV       Added calf stretch and forward step ups       TREATMENT:  EXERCISES Date 24 Date 24 Date 24 Date    REPS REPS REPS REPS          Nustep L4     8 mins L5  10 mins L5 10 min    Quad set 5\"H X 10      Heel slide 10X      Shuttle  DLP 5B  3 X 10 5B  3 X 10 5B 3x15    Shuttle SLP 3B  3 X 10 3B  3 X 10 3B 3X10    Shuttle TR/HR  5B  3 X 10 5B 3X10           Qhip Flexion  40#  2 X 10 40# 2x10 ea     Qhip Extension       Qhip Abduction  40#  2 X 10 40# 2x10 ea    Qhip  TKE              Q Quad       Q Hamstring   25#  3 X 10 50# 3X10    Forward step up   6\" 1x15 ea    Seated hamstring stretch  10\"H  X 30 ea leg 3x30\"H ea    Calf stretch    3x30\"H                                                                           CP bilat knees 8 mins 10  mins 10 min       Access Code: [email or web address]  URL: [email or web address]  Date: 2024  Prepared by: Norma" Mitch    Exercises  - Supine Quad Set  - 1 x daily - 7 x weekly - 1-2 sets - 10 reps  - Supine Heel Slide  - 1 x daily - 7 x weekly - 1 sets - 10 reps  - Seated Heel Slide  - 1 x daily - 7 x weekly - 1 sets - 10 reps  - Clamshell with Resistance  - 1 x daily - 7 x weekly - 1-2 sets - 10 reps    ASSESSMENT  Pt reports no increase in knee pain with exercise progression. Pt needed verbal cueing and demonstration to perform LE stretches with the correct technique.     PLAN  The pt will be seen 2 days a week for 4-8 weeks.      The pt has been educated about the risks and benefits of physical therapy and gives consent for treatment.     The patient will benefit from physical therapy treatment to include:  bilat knee ROM, strengthening, proprioception and gait training    Goals:  Improve knee ROM by at least 10 degrees to help normalize gait pattern-4 weeks  Pt will ambulate with minimal deviation without device-4 weeks  Pt will have sufficient knee ROM so that he can don/doff shoes and socks with minimal difficulty-4 weeks  Pt will be able to get into/out of car with minimal difficulty-6-8 weeks  Improve LEFS score >45 to facilitate return to prior level of function-6-8 weeks

## 2024-09-25 ENCOUNTER — TREATMENT (OUTPATIENT)
Dept: PHYSICAL THERAPY | Facility: CLINIC | Age: 69
End: 2024-09-25
Payer: MEDICARE

## 2024-09-25 DIAGNOSIS — M17.0 PRIMARY OSTEOARTHRITIS OF BOTH KNEES: ICD-10-CM

## 2024-09-25 PROCEDURE — 97110 THERAPEUTIC EXERCISES: CPT | Mod: GP,CQ

## 2024-09-25 ASSESSMENT — PAIN SCALES - GENERAL: PAINLEVEL_OUTOF10: 1

## 2024-09-25 ASSESSMENT — PAIN - FUNCTIONAL ASSESSMENT: PAIN_FUNCTIONAL_ASSESSMENT: 0-10

## 2024-09-25 NOTE — PROGRESS NOTES
"Physical Therapy    Physical Therapy Treatment    Patient Name: Jordan Bray  MRN: [id number]  : 1955   Today's Date: 2024  Time Calculation  Start Time: 930  Stop Time: 0  Time Calculation (min): 50 min     PT Therapeutic Procedures Time Entry  Therapeutic Exercise Time Entry: 50             Visit: 4  Auth: MN    Current Problem  Problem List Items Addressed This Visit             ICD-10-CM    Primary osteoarthritis of both knees M17.0          Precautions   Precautions  Precautions Comment: none       Pain  Pain Assessment: 0-10  0-10 (Numeric) Pain Score: 1  Pain Location: Knee  Pain Orientation: Right, Left    SUBJECTIVE:   Pt reports that he was kneeling earlier today. He was not able to do this before starting PT.   Pt has \"grinding\" in his left knee when bending forward with knees bent.      OBJECTIVE:    Lower Extremity Strength: (5/5 unless noted)  MMT RIGHT LEFT   Hip Flexion     Hip Extension     Hip Abduction     Hip Adduction     Knee Extension 4+/5 4+/5   Knee Flexion 4+/5 4+/5   Ankle DF     Ankle PF     Ankle INV     Ankle EV       Added Q-hip TKE       TREATMENT:  EXERCISES Date 24 Date 24 Date 24 Date 24    REPS REPS REPS REPS          Nustep L4     8 mins L5  10 mins L5 10 min L5 10 min   Quad set 5\"H X 10      Heel slide 10X      Shuttle  DLP 5B  3 X 10 5B  3 X 10 5B 3x15 5B 3x15   Shuttle SLP 3B  3 X 10 3B  3 X 10 3B 3X10 3B 3x15   Shuttle TR/HR  5B  3 X 10 5B 3X10 3B 30x          Qhip Flexion  40#  2 X 10 40# 2x10 ea  40# 2x10 ea   Qhip Extension       Qhip Abduction  40#  2 X 10 40# 2x10 ea 40# 2x10 ea   Qhip  TKE    50# 15x 5\"H ea          Q Quad       Q Hamstring   25#  3 X 10 50# 3X10 50# 3x10   Forward step up   6\" 1x15 ea 6\" 2x10 ea   Seated hamstring stretch  10\"H  X 30 ea leg 3x30\"H ea 3x30\"H ea   Calf stretch    3x30\"H  3x30\"H                                                                         CP bilat knees 8 mins 10  mins 10 min       Access " "Code: [email or web address]  URL: [email or web address]  Date: 09/13/2024  Prepared by: Norma Meyer    Exercises  - Supine Quad Set  - 1 x daily - 7 x weekly - 1-2 sets - 10 reps  - Supine Heel Slide  - 1 x daily - 7 x weekly - 1 sets - 10 reps  - Seated Heel Slide  - 1 x daily - 7 x weekly - 1 sets - 10 reps  - Clamshell with Resistance  - 1 x daily - 7 x weekly - 1-2 sets - 10 reps    ASSESSMENT  Pt reports \"slight twinge\" in the left medial knee with shuttle SLP. Pt is tolerating progressed strengthening exercises well.   Pt has very tight hamstrings. He will start doing hamstring stretches daily.    PLAN  The pt will be seen 2 days a week for 4-8 weeks.      The pt has been educated about the risks and benefits of physical therapy and gives consent for treatment.     The patient will benefit from physical therapy treatment to include:  bilat knee ROM, strengthening, proprioception and gait training    Goals:  Improve knee ROM by at least 10 degrees to help normalize gait pattern-4 weeks  Pt will ambulate with minimal deviation without device-4 weeks  Pt will have sufficient knee ROM so that he can don/doff shoes and socks with minimal difficulty-4 weeks  Pt will be able to get into/out of car with minimal difficulty-6-8 weeks  Improve LEFS score >45 to facilitate return to prior level of function-6-8 weeks      "

## 2024-09-27 ENCOUNTER — TREATMENT (OUTPATIENT)
Dept: PHYSICAL THERAPY | Facility: CLINIC | Age: 69
End: 2024-09-27
Payer: MEDICARE

## 2024-09-27 DIAGNOSIS — M17.0 PRIMARY OSTEOARTHRITIS OF BOTH KNEES: ICD-10-CM

## 2024-09-27 PROCEDURE — 97110 THERAPEUTIC EXERCISES: CPT | Mod: GP,CQ

## 2024-09-27 ASSESSMENT — PAIN - FUNCTIONAL ASSESSMENT: PAIN_FUNCTIONAL_ASSESSMENT: 0-10

## 2024-09-27 ASSESSMENT — PAIN SCALES - GENERAL: PAINLEVEL_OUTOF10: 0 - NO PAIN

## 2024-09-27 NOTE — PROGRESS NOTES
"Physical Therapy    Physical Therapy Treatment    Patient Name: Jordan Bray  MRN: [id number]  : 1955   Today's Date: 2024  Time Calculation  Start Time: 930  Stop Time:   Time Calculation (min): 55 min     PT Therapeutic Procedures Time Entry  Therapeutic Exercise Time Entry: 45  PT Modalities Time Entry  Hot/Cold Pack Time Entry: 10          Visit: 5  Auth: MN    Current Problem  Problem List Items Addressed This Visit             ICD-10-CM    Primary osteoarthritis of both knees M17.0          Precautions   Precautions  Precautions Comment: none       Pain  Pain Assessment: 0-10  0-10 (Numeric) Pain Score: 0 - No pain  Pain Location: Knee  Pain Orientation: Right, Left    SUBJECTIVE:   Pt reports that both knees feel better since beginning PT. He is able to up and down stairs without getting his \"foot caught\" and decreased pain.      OBJECTIVE:    Lower Extremity Strength: (5/5 unless noted)  MMT RIGHT LEFT   Hip Flexion     Hip Extension     Hip Abduction     Hip Adduction     Knee Extension 4+/5 4+/5   Knee Flexion 4+/5 4+/5   Ankle DF     Ankle PF     Ankle INV     Ankle EV            TREATMENT:  EXERCISES Date 24 Date  Date  Date     REPS REPS REPS REPS          Nustep L5   10 mins      Quad set       Heel slide       Shuttle  DLP 6B  3 X 10      Shuttle SLP 4B  3 X 10      Shuttle TR/HR 4B 30x             Qhip Flexion 40# 2x10      Qhip Extension       Qhip Abduction 40# 2x10      Qhip  TKE 50# 20x5\"H R/L             Q Quad       Q Hamstring  50# 3x15      Forward step up 6\" 20x ea      Seated hamstring stretch 3x30\"H      Calf stretch  3x30\"H                                                                            CP bilat knees 10 mins         Access Code: [email or web address]  URL: [email or web address]  Date: 2024  Prepared by: Norma Meyer    Exercises  - Supine Quad Set  - 1 x daily - 7 x weekly - 1-2 sets - 10 reps  - Supine Heel Slide  - 1 x daily - 7 x weekly " - 1 sets - 10 reps  - Seated Heel Slide  - 1 x daily - 7 x weekly - 1 sets - 10 reps  - Clamshell with Resistance  - 1 x daily - 7 x weekly - 1-2 sets - 10 reps    ASSESSMENT  Pt is able to ascend and descend a flight of stairs with a reciprocal pattern and 1-2 hands on the rail.   Mild left knee soreness at the end of his treatment. No change in right knee pain.      PLAN  The pt will be seen 2 days a week for 4-8 weeks.      The pt has been educated about the risks and benefits of physical therapy and gives consent for treatment.     The patient will benefit from physical therapy treatment to include:  bilat knee ROM, strengthening, proprioception and gait training    Goals:  Improve knee ROM by at least 10 degrees to help normalize gait pattern-4 weeks  Pt will ambulate with minimal deviation without device-4 weeks  Pt will have sufficient knee ROM so that he can don/doff shoes and socks with minimal difficulty-4 weeks  Pt will be able to get into/out of car with minimal difficulty-6-8 weeks  Improve LEFS score >45 to facilitate return to prior level of function-6-8 weeks

## 2024-09-30 ENCOUNTER — TREATMENT (OUTPATIENT)
Dept: PHYSICAL THERAPY | Facility: CLINIC | Age: 69
End: 2024-09-30
Payer: MEDICARE

## 2024-09-30 DIAGNOSIS — M17.0 PRIMARY OSTEOARTHRITIS OF BOTH KNEES: ICD-10-CM

## 2024-09-30 PROCEDURE — 97110 THERAPEUTIC EXERCISES: CPT | Mod: GP | Performed by: PHYSICAL THERAPIST

## 2024-09-30 ASSESSMENT — PAIN - FUNCTIONAL ASSESSMENT: PAIN_FUNCTIONAL_ASSESSMENT: 0-10

## 2024-09-30 ASSESSMENT — PAIN SCALES - GENERAL: PAINLEVEL_OUTOF10: 1

## 2024-09-30 NOTE — PROGRESS NOTES
"Physical Therapy    Physical Therapy Treatment    Patient Name: Jordan Bray  MRN: [id number]  : 1955   Today's Date: 2024  Time Calculation  Start Time: 915  Stop Time: 0  Time Calculation (min): 55 min  PT Therapeutic Procedures Time Entry  Therapeutic Exercise Time Entry: 45        Non-Billable Time  Non-billable time: 10  Non-billable time reason: ice pack  Visit Number: 6      Current Problem  Problem List Items Addressed This Visit             ICD-10-CM       Musculoskeletal and Injuries    Primary osteoarthritis of both knees M17.0        Subjective   Pt reports mild increased pain in his L knee. States he was doing some exercise on his home unit and may have done too much on Saturday. Increased pain has reduced since, but he still notices minor increased pain.     Precautions   Precautions Comment: none    Pain  Pain Assessment: 0-10  0-10 (Numeric) Pain Score: 1  Pain Location: Knee  Pain Orientation: Right, Left    Objective      No new abnormalities observed.  Increased step height for step-ups, see chart below for details.      Treatments:  TREATMENT:  EXERCISES Date 24 Date 2024   Date  Date     REPS REPS REPS REPS          Nustep L5   10 mins L5   10 mins     Quad set       Heel slide       Shuttle  DLP 6B  3 X 10 6B  3 X 10     Shuttle SLP 4B  3 X 10 4B  3 X 10     Shuttle TR/HR 4B 30x 4B 30x            Qhip Flexion 40# 2x10 40# 2x10     Qhip Extension       Qhip Abduction 40# 2x10 40# 2x10     Qhip  TKE 50# 20x5\"H R/L 50# 20x5\"H R/L            Q Quad       Q Hamstring  50# 3x15 50# 3x15     Forward step up 6\" 20x ea 8\" 10 x ea     Seated hamstring stretch 3x30\"H 3x30\"H     Calf stretch  3x30\"H 3x30\"H                                                                           CP bilat knees 10 mins           Assessment:     Patient reports 1 pain after treatment today. Pt was able to increase the step height for step-ups today without symptom aggravation, indicating strength " improvements.     Plan:   Continue with current plan, progress as able.    Goals:    Improve knee ROM by at least 10 degrees to help normalize gait pattern-4 weeks  Pt will ambulate with minimal deviation without device-4 weeks  Pt will have sufficient knee ROM so that he can don/doff shoes and socks with minimal difficulty-4 weeks  Pt will be able to get into/out of car with minimal difficulty-6-8 weeks  Improve LEFS score >45 to facilitate return to prior level of function-6-8 weeks

## 2024-10-02 ENCOUNTER — TREATMENT (OUTPATIENT)
Dept: PHYSICAL THERAPY | Facility: CLINIC | Age: 69
End: 2024-10-02
Payer: MEDICARE

## 2024-10-02 DIAGNOSIS — M17.0 PRIMARY OSTEOARTHRITIS OF BOTH KNEES: ICD-10-CM

## 2024-10-02 PROCEDURE — 97110 THERAPEUTIC EXERCISES: CPT | Mod: GP,CQ

## 2024-10-02 ASSESSMENT — PAIN SCALES - GENERAL: PAINLEVEL_OUTOF10: 0 - NO PAIN

## 2024-10-02 ASSESSMENT — PAIN - FUNCTIONAL ASSESSMENT: PAIN_FUNCTIONAL_ASSESSMENT: 0-10

## 2024-10-02 NOTE — PROGRESS NOTES
"Physical Therapy    Physical Therapy Treatment    Patient Name: Jordan Bray  MRN: [id number]  : 1955   Today's Date: 10/2/2024  Time Calculation  Start Time: 930  Stop Time: 1025  Time Calculation (min): 55 min  PT Therapeutic Procedures Time Entry  Therapeutic Exercise Time Entry: 45  PT Modalities Time Entry  Hot/Cold Pack Time Entry: 10        Visit Number: 7      Current Problem  Problem List Items Addressed This Visit             ICD-10-CM    Primary osteoarthritis of both knees M17.0        Subjective   Pt reports that the exercises that he is doing at PT and at home have been very helpful. He has significantly less pain with walking and daily activity.    Precautions   Precautions Comment: none    Pain  Pain Assessment: 0-10  0-10 (Numeric) Pain Score: 0 - No pain  Pain Location: Knee  Pain Orientation: Right, Left    Objective   Increased Q-hip TKE and hamstring curl resistance       Knee extension AROM  Left -10 deg  Right -12 deg      TREATMENT:  EXERCISES Date 24 Date 24   Date 10/2/24 Date     REPS REPS REPS REPS          Nustep L5   10 mins L5   10 mins L5 10 min    Quad set       Heel slide       Shuttle  DLP 6B  3 X 10 6B  3 X 10 6B 3x10    Shuttle SLP 4B  3 X 10 4B  3 X 10 4B 3x10    Shuttle TR/HR 4B 30x 4B 30x 4B 3x10           Qhip Flexion 40# 2x10 40# 2x10 40# 2x10    Qhip Extension       Qhip Abduction 40# 2x10 40# 2x10 40# 2x10    Qhip  TKE 50# 20x5\"H R/L 50# 20x5\"H R/L 70# 20x5\"H R/L           Q Quad       Q Hamstring  50# 3x15 50# 3x15 65# 3X15    Forward step up 6\" 20x ea 8\" 10 x ea 8\" 20x ea    Seated hamstring stretch 3x30\"H 3x30\"H 3x30\"H ea    Calf stretch  3x30\"H 3x30\"H 3x30\"H                                                                           CP bilat knees 10 mins           Assessment:   Pt completed his exercise program with 1/10 left medial knee soreness. Pt tolerates exercise progression well. Pt will begin doing hamstring stretches 1-2 times daily to help " increase bilateral knee extension.    Plan:   Continue with current plan, progress as able.    Goals:    Improve knee ROM by at least 10 degrees to help normalize gait pattern-4 weeks  Pt will ambulate with minimal deviation without device-4 weeks  Pt will have sufficient knee ROM so that he can don/doff shoes and socks with minimal difficulty-4 weeks  Pt will be able to get into/out of car with minimal difficulty-6-8 weeks  Improve LEFS score >45 to facilitate return to prior level of function-6-8 weeks

## 2024-10-07 ENCOUNTER — TREATMENT (OUTPATIENT)
Dept: PHYSICAL THERAPY | Facility: CLINIC | Age: 69
End: 2024-10-07
Payer: MEDICARE

## 2024-10-07 DIAGNOSIS — M17.0 PRIMARY OSTEOARTHRITIS OF BOTH KNEES: ICD-10-CM

## 2024-10-07 PROCEDURE — 97110 THERAPEUTIC EXERCISES: CPT | Mod: GP | Performed by: PHYSICAL THERAPIST

## 2024-10-07 ASSESSMENT — PAIN - FUNCTIONAL ASSESSMENT: PAIN_FUNCTIONAL_ASSESSMENT: 0-10

## 2024-10-07 ASSESSMENT — PAIN SCALES - GENERAL: PAINLEVEL_OUTOF10: 0 - NO PAIN

## 2024-10-07 NOTE — PROGRESS NOTES
"Physical Therapy    Physical Therapy Treatment    Patient Name: Jordan Bray  MRN: [id number]  : 1955   Today's Date: 10/7/2024  Time Calculation  Start Time: 930  Stop Time:   Time Calculation (min): 45 min  PT Therapeutic Procedures Time Entry  Therapeutic Exercise Time Entry: 45           Visit Number: 8      Current Problem  Problem List Items Addressed This Visit             ICD-10-CM       Musculoskeletal and Injuries    Primary osteoarthritis of both knees M17.0        Subjective   Pt doing well today. Denies pain.     Precautions    Precautions Comment: none    Pain  Pain Assessment: 0-10  0-10 (Numeric) Pain Score: 0 - No pain  Pain Location: Knee  Pain Orientation: Right, Left    Objective      Increased weight of Q-hip.     Outcome Measures:  Not today.     Treatments:    EXERCISES Date 24 Date 24   Date 10/2/24 Date 10/7/2024      REPS REPS REPS REPS          Nustep L5   10 mins L5   10 mins L5 10 min L5 10 min   Quad set       Heel slide       Shuttle  DLP 6B  3 X 10 6B  3 X 10 6B 3x10 6B 3x10   Shuttle SLP 4B  3 X 10 4B  3 X 10 4B 3x10 4B 3x10   Shuttle TR/HR 4B 30x 4B 30x 4B 3x10 4B 3x10          Qhip Flexion 40# 2x10 40# 2x10 40# 2x10 40# 2x10   Qhip Extension       Qhip Abduction 40# 2x10 40# 2x10 40# 2x10 40# 2x10   Qhip  TKE 50# 20x5\"H R/L 50# 20x5\"H R/L 70# 20x5\"H R/L 70# 20x5\"H R/L          Q Quad       Q Hamstring  50# 3x15 50# 3x15 65# 3X15 70# 3X15   Forward step up 6\" 20x ea 8\" 10 x ea 8\" 20x ea 8\" 20x ea   Seated hamstring stretch 3x30\"H 3x30\"H 3x30\"H ea 3x30\"H ea   Calf stretch  3x30\"H 3x30\"H 3x30\"H  3x30\"H                                                                          CP bilat knees 10 mins             Assessment:     Patient reports 0 pain after treatment today.  Pt was able to increase the weight on Q Hamstring without symptom aggravation, indicating strength improvements. He demonstrated improved quality of movement during step-ups as well.     Plan:   " Re-assess next visit.    Goals:    Improve knee ROM by at least 10 degrees to help normalize gait pattern-4 weeks  Pt will ambulate with minimal deviation without device-4 weeks  Pt will have sufficient knee ROM so that he can don/doff shoes and socks with minimal difficulty-4 weeks  Pt will be able to get into/out of car with minimal difficulty-6-8 weeks  Improve LEFS score >45 to facilitate return to prior level of function-6-8 weeks

## 2024-10-09 ENCOUNTER — TREATMENT (OUTPATIENT)
Dept: PHYSICAL THERAPY | Facility: CLINIC | Age: 69
End: 2024-10-09
Payer: MEDICARE

## 2024-10-09 DIAGNOSIS — M17.0 PRIMARY OSTEOARTHRITIS OF BOTH KNEES: ICD-10-CM

## 2024-10-09 PROCEDURE — 97110 THERAPEUTIC EXERCISES: CPT | Mod: GP

## 2024-10-09 ASSESSMENT — PAIN - FUNCTIONAL ASSESSMENT: PAIN_FUNCTIONAL_ASSESSMENT: 0-10

## 2024-10-09 ASSESSMENT — PAIN SCALES - GENERAL: PAINLEVEL_OUTOF10: 0 - NO PAIN

## 2024-10-09 NOTE — PROGRESS NOTES
Physical Therapy    Physical Therapy Treatment/ Recheck/ Discharge    Patient Name: Jordan Bray  MRN: [id number]  Today's Date: 10/9/2024    Time Entry:   Time Calculation  Start Time: 1015  Stop Time: 1105  Time Calculation (min): 50 min     PT Therapeutic Procedures Time Entry  Therapeutic Exercise Time Entry: 50                   Assessment:    Jordan has completed 9 outpatient PT visits to address his bilateral knee pain, decreased ROM and strength. He is still getting very good pain relief from his recent injections and demonstrates improved knee ROM, strength and function. He is compliant with his HEP which we did review and update today. He is ready for discharge from formal PT and all goals have been met.  See objective data and goal status.     Plan:   Discharge from PT with all goals met and independent with HEP for ongoing self management.     Current Problem  1. Primary osteoarthritis of both knees  Follow Up In Physical Therapy          General     General  General Comment: Doing pretty well. KNees are a little sore. Has been compliant with his HEP. He is still getting really good relief from his recent bilateral knee injections.    Subjective    Precautions  Precautions  STEADI Fall Risk Score (The score of 4 or more indicates an increased risk of falling): 0  Precautions Comment: none  Pain  Pain Assessment  Pain Assessment: 0-10  0-10 (Numeric) Pain Score: 0 - No pain (1/10 at worst)  Pain Location: Knee  Pain Orientation: Right, Left    Objective        Lower Extremity Strength: (5/5 unless noted)  MMT RIGHT LEFT   Hip Flexion 4+/5 ext lag 4+/5 ext lag   Hip Extension     Hip Abduction 5/5 5/5   Hip Adduction 4+/5 4+/5   Knee Extension 5/5 5/5   Knee Flexion 4+/5 4+/5   Ankle DF     Ankle PF     Ankle INV     Ankle EV       Lower Extremity ROM: WNL unless documented below:  ROM in Degrees  RIGHT LEFT   Knee Extension -8 from neutral with a quad set -7 from neutral  With quad stretch   Knee Flexion  "125 120     Posture: maintains bilat knees in flexed posture  Gait: Ambulates with slight bilateral knee flexion, FWB, no device for all distances now. Can do stairs reciprocally with less stiffness now.       Outcome Measures:  Other Measures  Lower Extremity Funtional Score (LEFS): 61    Treatments:    EXERCISES Date 10/2/24 Date 10/7/2024    Date: 10/9/24    REPS REPS    Recheck    15 min   Nustep L5 10 min L5 10 min  L5 x 10 min   Quad set      Heel slide      Shuttle  DLP 6B 3x10 6B 3x10  6B 3 x 10   Shuttle SLP 4B 3x10 4B 3x10  4B 3 x 10   Shuttle TR/HR 4B 3x10 4B 3x10  4B 3 x 10         Qhip Flexion 40# 2x10 40# 2x10    Qhip Extension      Qhip Abduction 40# 2x10 40# 2x10    Qhip  TKE 70# 20x5\"H R/L 70# 20x5\"H R/L          Q Quad      Q Hamstring  65# 3X15 70# 3X15 70# 3 x 15   Forward step up 8\" 20x ea 8\" 20x ea    Seated hamstring stretch 3x30\"H ea 3x30\"H ea home   Calf stretch  3x30\"H  3x30\"H  home                                 HEP   Reviewed and updated    PT Education:  Access Code: [email or web address]  URL: [email or web address]  Date: 10/09/2024  Prepared by: Susana Bonilla    Exercises  - Supine Straight Leg Raises  - 1 x daily - 7 x weekly - 2 sets - 10 reps  - Side Leg Lifts  - 1 x daily - 7 x weekly - 2 sets - 10 reps  - Standing Hip Abduction  - 1 x daily - 7 x weekly - 2 sets - 10 reps  Goals: as of recheck/ discharge on 10/9/24:    Improve knee ROM by at least 10 degrees to help normalize gait pattern-4 weeks-- met  Pt will ambulate with minimal deviation without device-4 weeks- met, no device; knees flexed though  Pt will have sufficient knee ROM so that he can don/doff shoes and socks with minimal difficulty-4 weeks-   Pt will be able to get into/out of car with minimal difficulty-6-8 weeks met  Improve LEFS score >45 to facilitate return to prior level of function-6-8 weeks met        "

## 2024-11-04 ENCOUNTER — APPOINTMENT (OUTPATIENT)
Dept: PRIMARY CARE | Facility: CLINIC | Age: 69
End: 2024-11-04
Payer: MEDICARE

## 2024-11-19 ENCOUNTER — LAB (OUTPATIENT)
Dept: LAB | Facility: LAB | Age: 69
End: 2024-11-19
Payer: MEDICARE

## 2024-11-19 ENCOUNTER — APPOINTMENT (OUTPATIENT)
Dept: PRIMARY CARE | Facility: CLINIC | Age: 69
End: 2024-11-19
Payer: MEDICARE

## 2024-11-19 VITALS
SYSTOLIC BLOOD PRESSURE: 132 MMHG | HEART RATE: 63 BPM | HEIGHT: 73 IN | BODY MASS INDEX: 27.57 KG/M2 | OXYGEN SATURATION: 97 % | TEMPERATURE: 97 F | WEIGHT: 208 LBS | DIASTOLIC BLOOD PRESSURE: 74 MMHG

## 2024-11-19 DIAGNOSIS — R73.01 IFG (IMPAIRED FASTING GLUCOSE): ICD-10-CM

## 2024-11-19 DIAGNOSIS — M25.561 CHRONIC PAIN OF BOTH KNEES: ICD-10-CM

## 2024-11-19 DIAGNOSIS — G89.29 CHRONIC PAIN OF BOTH KNEES: ICD-10-CM

## 2024-11-19 DIAGNOSIS — M25.562 CHRONIC PAIN OF BOTH KNEES: ICD-10-CM

## 2024-11-19 DIAGNOSIS — N52.9 ERECTILE DYSFUNCTION, UNSPECIFIED ERECTILE DYSFUNCTION TYPE: ICD-10-CM

## 2024-11-19 DIAGNOSIS — M54.50 BILATERAL LOW BACK PAIN, UNSPECIFIED CHRONICITY, UNSPECIFIED WHETHER SCIATICA PRESENT: ICD-10-CM

## 2024-11-19 DIAGNOSIS — E78.2 MIXED HYPERLIPIDEMIA: ICD-10-CM

## 2024-11-19 DIAGNOSIS — Z12.5 SCREENING FOR PROSTATE CANCER: Primary | ICD-10-CM

## 2024-11-19 DIAGNOSIS — Z12.5 SCREENING FOR PROSTATE CANCER: ICD-10-CM

## 2024-11-19 DIAGNOSIS — Z00.00 MEDICARE ANNUAL WELLNESS VISIT, SUBSEQUENT: ICD-10-CM

## 2024-11-19 PROBLEM — D48.5 NEOPLASM OF UNCERTAIN BEHAVIOR OF SKIN: Status: RESOLVED | Noted: 2023-06-27 | Resolved: 2024-11-19

## 2024-11-19 LAB
ALBUMIN SERPL BCP-MCNC: 4 G/DL (ref 3.4–5)
ALP SERPL-CCNC: 82 U/L (ref 33–136)
ALT SERPL W P-5'-P-CCNC: 17 U/L (ref 10–52)
ANION GAP SERPL CALC-SCNC: 10 MMOL/L (ref 10–20)
AST SERPL W P-5'-P-CCNC: 22 U/L (ref 9–39)
BILIRUB SERPL-MCNC: 0.8 MG/DL (ref 0–1.2)
BUN SERPL-MCNC: 18 MG/DL (ref 6–23)
CALCIUM SERPL-MCNC: 9.6 MG/DL (ref 8.6–10.3)
CHLORIDE SERPL-SCNC: 105 MMOL/L (ref 98–107)
CHOLEST SERPL-MCNC: 128 MG/DL (ref 0–199)
CHOLESTEROL/HDL RATIO: 2.2
CO2 SERPL-SCNC: 27 MMOL/L (ref 21–32)
CREAT SERPL-MCNC: 0.92 MG/DL (ref 0.5–1.3)
EGFRCR SERPLBLD CKD-EPI 2021: 89 ML/MIN/1.73M*2
GLUCOSE SERPL-MCNC: 95 MG/DL (ref 74–99)
HDLC SERPL-MCNC: 57.6 MG/DL
LDLC SERPL CALC-MCNC: 54 MG/DL
NON HDL CHOLESTEROL: 70 MG/DL (ref 0–149)
POTASSIUM SERPL-SCNC: 4.4 MMOL/L (ref 3.5–5.3)
PROT SERPL-MCNC: 6.4 G/DL (ref 6.4–8.2)
PSA SERPL-MCNC: 0.76 NG/ML
SODIUM SERPL-SCNC: 138 MMOL/L (ref 136–145)
TRIGL SERPL-MCNC: 82 MG/DL (ref 0–149)
VIT B12 SERPL-MCNC: 555 PG/ML (ref 211–911)
VLDL: 16 MG/DL (ref 0–40)

## 2024-11-19 PROCEDURE — 3078F DIAST BP <80 MM HG: CPT | Performed by: FAMILY MEDICINE

## 2024-11-19 PROCEDURE — G0103 PSA SCREENING: HCPCS

## 2024-11-19 PROCEDURE — 1036F TOBACCO NON-USER: CPT | Performed by: FAMILY MEDICINE

## 2024-11-19 PROCEDURE — 80061 LIPID PANEL: CPT

## 2024-11-19 PROCEDURE — 3008F BODY MASS INDEX DOCD: CPT | Performed by: FAMILY MEDICINE

## 2024-11-19 PROCEDURE — 82607 VITAMIN B-12: CPT

## 2024-11-19 PROCEDURE — 99213 OFFICE O/P EST LOW 20 MIN: CPT | Performed by: FAMILY MEDICINE

## 2024-11-19 PROCEDURE — 1159F MED LIST DOCD IN RCRD: CPT | Performed by: FAMILY MEDICINE

## 2024-11-19 PROCEDURE — G0439 PPPS, SUBSEQ VISIT: HCPCS | Performed by: FAMILY MEDICINE

## 2024-11-19 PROCEDURE — 80053 COMPREHEN METABOLIC PANEL: CPT

## 2024-11-19 PROCEDURE — 83036 HEMOGLOBIN GLYCOSYLATED A1C: CPT

## 2024-11-19 PROCEDURE — 36415 COLL VENOUS BLD VENIPUNCTURE: CPT

## 2024-11-19 PROCEDURE — 3075F SYST BP GE 130 - 139MM HG: CPT | Performed by: FAMILY MEDICINE

## 2024-11-19 PROCEDURE — 1170F FXNL STATUS ASSESSED: CPT | Performed by: FAMILY MEDICINE

## 2024-11-19 RX ORDER — SIMVASTATIN 20 MG/1
20 TABLET, FILM COATED ORAL NIGHTLY
Qty: 90 TABLET | Refills: 3 | Status: SHIPPED | OUTPATIENT
Start: 2024-11-19

## 2024-11-19 RX ORDER — NORTRIPTYLINE HYDROCHLORIDE 25 MG/1
25 CAPSULE ORAL NIGHTLY
Qty: 90 CAPSULE | Refills: 1 | Status: SHIPPED | OUTPATIENT
Start: 2024-11-19 | End: 2025-05-18

## 2024-11-19 RX ORDER — SILDENAFIL 100 MG/1
100 TABLET, FILM COATED ORAL DAILY PRN
Qty: 12 TABLET | Refills: 3 | Status: SHIPPED | OUTPATIENT
Start: 2024-11-19 | End: 2025-11-19

## 2024-11-19 ASSESSMENT — ACTIVITIES OF DAILY LIVING (ADL)
GROCERY_SHOPPING: INDEPENDENT
DRESSING: INDEPENDENT
BATHING: INDEPENDENT
TAKING_MEDICATION: INDEPENDENT
DOING_HOUSEWORK: INDEPENDENT
MANAGING_FINANCES: INDEPENDENT

## 2024-11-19 ASSESSMENT — PATIENT HEALTH QUESTIONNAIRE - PHQ9
1. LITTLE INTEREST OR PLEASURE IN DOING THINGS: NOT AT ALL
2. FEELING DOWN, DEPRESSED OR HOPELESS: NOT AT ALL
SUM OF ALL RESPONSES TO PHQ9 QUESTIONS 1 AND 2: 0

## 2024-11-19 NOTE — PROGRESS NOTES
Subjective   Reason for Visit: Jordan Bray is an 69 y.o. male here for a Medicare Wellness visit.          Reviewed all medications by prescribing practitioner or clinical pharmacist (such as prescriptions, OTCs, herbal therapies and supplements) and documented in the medical record.    HPI    HPI: Annual Wellness visit. The patient has not felt depressed over the past 6 weeks. No trouble with bathing, dressing, eating, grooming, walking, toileting, house work, managing money or transportation. Has fallen 1-2 times. The home has functioning smoke alarms, grab bars in the bathroom and handrails on the stairs. The home does not have poor lighting. Denies hearing difficulty in the ears bilaterally. No diet restrictions.     Knee pain: better after injection in [place]    lipids: well controlled on simvastatin.    Preparing meals - independent  Using telephone - independent  Bladder - continent  Bowel - continent    Recent hospital stays -     ADLs - able to dress, walk and bath independently  Instrumental ADL's - able to shop, maintain finances, managing medications, housekeeping independently    Depression: PHQ-2 (screening 2 mins) - negative    Opioid use -   none  Exercise -  regularly  Diet - well balanced  Pain score - none    Providers    TITA - ordered    Education - educated pt on healthy eating, exercise, weight loss    Falls - 0        MiniCO/5    Counseled pt on living will: printed out sheet    AAA screening:  NA     Prostate CA screen:  PSA was normal     CV screening: stress negative last year    Colonoscopy: due     Diabetes screening:  normal    Glaucoma screen:  sees optho regularly    CT chest tob screen: NA    Vaccines:  giving flu shot and pneumonia shot    Patient Care Team:  Aldo Connolly MD as PCP - General  Han Cid PA-C as PCP [facility]     Review of Systems   All other systems reviewed and are negative.      Objective   Vitals:  /74   Pulse 63   " Temp 36.1 °C (97 °F)   Ht 1.842 m (6' 0.5\")   Wt 94.3 kg (208 lb)   SpO2 97%   BMI 27.82 kg/m²       Physical Exam  Vitals reviewed.   Constitutional:       General: He is not in acute distress.     Appearance: Normal appearance. He is well-developed. He is not diaphoretic.   HENT:      Head: Normocephalic and atraumatic.      Right Ear: Tympanic membrane normal.      Left Ear: Tympanic membrane normal.      Nose: Nose normal.      Mouth/Throat:      Mouth: Mucous membranes are moist.   Eyes:      Pupils: Pupils are equal, round, and reactive to light.   Cardiovascular:      Rate and Rhythm: Normal rate and regular rhythm.      Heart sounds: Normal heart sounds. No murmur heard.     No friction rub. No gallop.   Pulmonary:      Effort: Pulmonary effort is normal.      Breath sounds: Normal breath sounds. No rales.   Abdominal:      General: Bowel sounds are normal.      Palpations: Abdomen is soft.      Tenderness: There is no abdominal tenderness.   Musculoskeletal:      Cervical back: Normal range of motion and neck supple.   Skin:     General: Skin is warm and dry.   Neurological:      Mental Status: He is alert.   Psychiatric:         Mood and Affect: Mood normal.         Assessment/Plan   Problem List Items Addressed This Visit       Hyperlipidemia    Overview     Note: ak         Relevant Medications    simvastatin (Zocor) 20 mg tablet    Other Relevant Orders    Comprehensive metabolic panel    Hemoglobin A1c    Lipid panel     Other Visit Diagnoses       Screening for prostate cancer    -  Primary    Relevant Orders    Comprehensive metabolic panel    Hemoglobin A1c    Lipid panel    Prostate Specific Antigen    IFG (impaired fasting glucose)        Relevant Orders    Comprehensive metabolic panel    Hemoglobin A1c    Lipid panel    Chronic pain of both knees        Relevant Medications    nortriptyline (Pamelor) 25 mg capsule    Bilateral low back pain, unspecified chronicity, unspecified whether " sciatica present        Relevant Medications    nortriptyline (Pamelor) 25 mg capsule    Medicare annual wellness visit, subsequent        Erectile dysfunction, unspecified erectile dysfunction type        Relevant Medications    sildenafil (Viagra) 100 mg tablet          Refilled pts meds.   Fu in 6 mo for knee injection if needed.  Checking TITA.

## 2024-11-20 LAB
EST. AVERAGE GLUCOSE BLD GHB EST-MCNC: 120 MG/DL
HBA1C MFR BLD: 5.8 %

## 2025-02-27 ENCOUNTER — OFFICE VISIT (OUTPATIENT)
Dept: PRIMARY CARE | Facility: CLINIC | Age: 70
End: 2025-02-27
Payer: MEDICARE

## 2025-02-27 VITALS — HEART RATE: 68 BPM | DIASTOLIC BLOOD PRESSURE: 86 MMHG | TEMPERATURE: 97.6 F | SYSTOLIC BLOOD PRESSURE: 122 MMHG

## 2025-02-27 DIAGNOSIS — J01.90 ACUTE NON-RECURRENT SINUSITIS, UNSPECIFIED LOCATION: Primary | ICD-10-CM

## 2025-02-27 PROCEDURE — 3079F DIAST BP 80-89 MM HG: CPT | Performed by: FAMILY MEDICINE

## 2025-02-27 PROCEDURE — 1159F MED LIST DOCD IN RCRD: CPT | Performed by: FAMILY MEDICINE

## 2025-02-27 PROCEDURE — 3074F SYST BP LT 130 MM HG: CPT | Performed by: FAMILY MEDICINE

## 2025-02-27 PROCEDURE — 99213 OFFICE O/P EST LOW 20 MIN: CPT | Performed by: FAMILY MEDICINE

## 2025-02-27 PROCEDURE — 1036F TOBACCO NON-USER: CPT | Performed by: FAMILY MEDICINE

## 2025-02-27 RX ORDER — AMOXICILLIN AND CLAVULANATE POTASSIUM 875; 125 MG/1; MG/1
875 TABLET, FILM COATED ORAL 2 TIMES DAILY
Qty: 20 TABLET | Refills: 0 | Status: SHIPPED | OUTPATIENT
Start: 2025-02-27 | End: 2025-03-09

## 2025-02-27 ASSESSMENT — ENCOUNTER SYMPTOMS: SINUS COMPLAINT: 1

## 2025-02-27 NOTE — PROGRESS NOTES
Subjective   Patient ID: Jordan Brya is a 69 y.o. male who presents for Sinus Problem (Sinus pressure, Drainage, R ear pain x 1 month    NKI).  Sinus Problem      sinus congestion and drainage x 1 mo.  PND.  No fever or chills.  Cough.  R ear pain, no hearing loss.      Patient Active Problem List   Diagnosis    Essential hypertension    Hyperlipidemia    Obstructive sleep apnea syndrome    Primary osteoarthritis of both knees       Social Connections: Not on file       Current Outpatient Medications on File Prior to Visit   Medication Sig Dispense Refill    sildenafil (Viagra) 100 mg tablet Take 1 tablet (100 mg) by mouth once daily as needed for erectile dysfunction. 12 tablet 3    simvastatin (Zocor) 20 mg tablet Take 1 tablet (20 mg) by mouth once daily at bedtime. 90 tablet 3    timolol (Timoptic) 0.5 % ophthalmic solution Administer 1 drop into the right eye once daily.      nortriptyline (Pamelor) 25 mg capsule Take 1 capsule (25 mg) by mouth once daily at bedtime. (Patient not taking: Reported on 2/27/2025) 90 capsule 1     No current facility-administered medications on file prior to visit.        Vitals:    02/27/25 1136   BP: 122/86   Pulse: 68   Temp: 36.4 °C (97.6 °F)     There were no vitals filed for this visit.    Review of Systems   All other systems reviewed and are negative.      Objective     Physical Exam  Vitals reviewed.   Constitutional:       General: He is not in acute distress.     Appearance: Normal appearance. He is well-developed. He is not diaphoretic.   HENT:      Head: Normocephalic and atraumatic.      Right Ear: Tympanic membrane normal.      Left Ear: Tympanic membrane normal.      Nose: Nose normal.      Mouth/Throat:      Mouth: Mucous membranes are moist.   Eyes:      Pupils: Pupils are equal, round, and reactive to light.   Cardiovascular:      Rate and Rhythm: Normal rate and regular rhythm.      Heart sounds: Normal heart sounds. No murmur heard.     No friction rub. No  gallop.   Pulmonary:      Effort: Pulmonary effort is normal.      Breath sounds: Normal breath sounds. No rales.   Abdominal:      General: Bowel sounds are normal.      Palpations: Abdomen is soft.      Tenderness: There is no abdominal tenderness.   Musculoskeletal:      Cervical back: Normal range of motion and neck supple.   Skin:     General: Skin is warm and dry.   Neurological:      Mental Status: He is alert.   Psychiatric:         Mood and Affect: Mood normal.         No visits with results within 2 Month(s) from this visit.   Latest known visit with results is:   Lab on 11/19/2024   Component Date Value Ref Range Status    Glucose 11/19/2024 95  74 - 99 mg/dL Final    Sodium 11/19/2024 138  136 - 145 mmol/L Final    Potassium 11/19/2024 4.4  3.5 - 5.3 mmol/L Final    Chloride 11/19/2024 105  98 - 107 mmol/L Final    Bicarbonate 11/19/2024 27  21 - 32 mmol/L Final    Anion Gap 11/19/2024 10  10 - 20 mmol/L Final    Urea Nitrogen 11/19/2024 18  6 - 23 mg/dL Final    Creatinine 11/19/2024 0.92  0.50 - 1.30 mg/dL Final    eGFR 11/19/2024 89  >60 mL/min/1.73m*2 Final    Calculations of estimated GFR are performed using the 2021 CKD-EPI Study Refit equation without the race variable for the IDMS-Traceable creatinine methods.  https://jasn.asnjournals.org/content/early/2021/09/22/ASN.1483887588    Calcium 11/19/2024 9.6  8.6 - 10.3 mg/dL Final    Albumin 11/19/2024 4.0  3.4 - 5.0 g/dL Final    Alkaline Phosphatase 11/19/2024 82  33 - 136 U/L Final    Total Protein 11/19/2024 6.4  6.4 - 8.2 g/dL Final    AST 11/19/2024 22  9 - 39 U/L Final    Bilirubin, Total 11/19/2024 0.8  0.0 - 1.2 mg/dL Final    ALT 11/19/2024 17  10 - 52 U/L Final    Patients treated with Sulfasalazine may generate falsely decreased results for ALT.    Hemoglobin A1C 11/19/2024 5.8 (H)  See comment % Final    Estimated Average Glucose 11/19/2024 120  Not Established mg/dL Final    Cholesterol 11/19/2024 128  0 - 199 mg/dL Final          Age       Desirable   Borderline High   High     0-19 Y     0 - 169       170 - 199     >/= 200    20-24 Y     0 - 189       190 - 224     >/= 225         >24 Y     0 - 199       200 - 239     >/= 240   **All ranges are based on fasting samples. Specific   therapeutic targets will vary based on patient-specific   cardiac risk.    Pediatric guidelines reference:Pediatrics 2011, 128(S5).Adult guidelines reference: NCEP ATPIII Guidelines,ADALGISA 2001, 258:2486-97    Venipuncture immediately after or during the administration of Metamizole may lead to falsely low results. Testing should be performed immediately prior to Metamizole dosing.    HDL-Cholesterol 11/19/2024 57.6  mg/dL Final      Age       Very Low   Low     Normal    High    0-19 Y    < 35      < 40     40-45     ----  20-24 Y    ----     < 40      >45      ----        >24 Y      ----     < 40     40-60      >60      Cholesterol/HDL Ratio 11/19/2024 2.2   Final      Ref Values  Desirable  < 3.4  High Risk  > 5.0    LDL Calculated 11/19/2024 54  <=99 mg/dL Final                                Near   Borderline      AGE      Desirable  Optimal    High     High     Very High     0-19 Y     0 - 109     ---    110-129   >/= 130     ----    20-24 Y     0 - 119     ---    120-159   >/= 160     ----      >24 Y     0 -  99   100-129  130-159   160-189     >/=190      VLDL 11/19/2024 16  0 - 40 mg/dL Final    Triglycerides 11/19/2024 82  0 - 149 mg/dL Final    Age              Desirable        Borderline         High        Very High  SEX:B           mg/dL             mg/dL               mg/dL      mg/dL  <=14D                       ----               ----        ----  15D-365D                    ----               ----        ----  1Y-9Y           0-74               75-99             >=100       ----  10Y-19Y        0-89                            >=130       ----  20Y-24Y        0-114             115-149             >=150      ----  >= 25Y         0-149              150-199             200-499    >=500      Venipuncture immediately after or during the administration of Metamizole may lead to falsely low results. Testing should be performed immediately prior to Metamizole dosing.    Non HDL Cholesterol 11/19/2024 70  0 - 149 mg/dL Final          Age       Desirable   Borderline High   High     Very High     0-19 Y     0 - 119       120 - 144     >/= 145    >/= 160    20-24 Y     0 - 149       150 - 189     >/= 190      ----         >24 Y    30 mg/dL above LDL Cholesterol goal      Prostate Specific AG 11/19/2024 0.76  <=4.00 ng/mL Final    Vitamin B12 11/19/2024 555  211 - 911 pg/mL Final       Assessment/Plan   Problem List Items Addressed This Visit    None  Visit Diagnoses         Codes    Acute non-recurrent sinusitis, unspecified location    -  Primary J01.90    Relevant Medications    amoxicillin-pot clavulanate (Augmentin) 875-125 mg tablet          Augmentin x 10 days

## 2025-03-19 ENCOUNTER — TELEPHONE (OUTPATIENT)
Dept: PRIMARY CARE | Facility: CLINIC | Age: 70
End: 2025-03-19
Payer: MEDICARE

## 2025-03-19 DIAGNOSIS — J01.90 ACUTE NON-RECURRENT SINUSITIS, UNSPECIFIED LOCATION: Primary | ICD-10-CM

## 2025-03-19 NOTE — TELEPHONE ENCOUNTER
Pt called stating that he was seen 02/27 for sxs and was given abx. He has finished the abx, but is still having the sxs.  Please advise.

## 2025-03-31 ENCOUNTER — APPOINTMENT (OUTPATIENT)
Dept: OTOLARYNGOLOGY | Facility: CLINIC | Age: 70
End: 2025-03-31
Payer: MEDICARE

## 2025-03-31 VITALS — BODY MASS INDEX: 27.78 KG/M2 | WEIGHT: 209.6 LBS | HEIGHT: 73 IN

## 2025-03-31 DIAGNOSIS — J34.829 NASAL VALVE COLLAPSE: Primary | ICD-10-CM

## 2025-03-31 DIAGNOSIS — J01.90 ACUTE NON-RECURRENT SINUSITIS, UNSPECIFIED LOCATION: ICD-10-CM

## 2025-03-31 DIAGNOSIS — R06.89 DIFFICULTY BREATHING: ICD-10-CM

## 2025-03-31 DIAGNOSIS — J34.2 DEVIATED SEPTUM: ICD-10-CM

## 2025-03-31 DIAGNOSIS — J34.3 HYPERTROPHY OF NASAL TURBINATES: ICD-10-CM

## 2025-03-31 PROCEDURE — 1159F MED LIST DOCD IN RCRD: CPT | Performed by: PHYSICIAN ASSISTANT

## 2025-03-31 PROCEDURE — 3008F BODY MASS INDEX DOCD: CPT | Performed by: PHYSICIAN ASSISTANT

## 2025-03-31 PROCEDURE — 1036F TOBACCO NON-USER: CPT | Performed by: PHYSICIAN ASSISTANT

## 2025-03-31 PROCEDURE — 31231 NASAL ENDOSCOPY DX: CPT | Performed by: PHYSICIAN ASSISTANT

## 2025-03-31 PROCEDURE — 99203 OFFICE O/P NEW LOW 30 MIN: CPT | Performed by: PHYSICIAN ASSISTANT

## 2025-03-31 RX ORDER — DOXYCYCLINE 100 MG/1
100 CAPSULE ORAL 2 TIMES DAILY
Qty: 28 CAPSULE | Refills: 0 | Status: SHIPPED | OUTPATIENT
Start: 2025-03-31 | End: 2025-04-14

## 2025-03-31 NOTE — PROGRESS NOTES
Facial Plastic & Reconstructive Surgery    Reason for consult: Sinus infection    Referring provider: Dr. Connolly, PCP    Chief Complaint: Obstructed breathing    Previous Otolaryngology Provider: No    Previous documentation for this patient was extensively reviewed including specific reasons for evaluation. Complex nature of complaint and medical issues prompting evaluation for surgical consideration by facial plastic & reconstructive surgeon.    Jordan Bray is a 69 y.o. male with PMHx of htn, hld, MARISABEL on 8L CPAP who presents in consultation for the evaluation of chronic sinus issues and Left sided nasal obstruction. Symptoms are constant, present year round, does not fluctuate. It affects the patients ability to sleep, exercise, and is troubling during the day. Symptoms  have been Lifelong; Has used daily FLONASE and AFRIN (only uses it at night when sick) years ago with minimal improvement of symptoms.     Epistaxis when blowing nose  Last treated for this sinus infection that started in January with Augmentin on 2/27/25; still having symptoms of nasal drainage, PND, left ear pain/muffled/fullness    Previous CT/MRI imaging of the nasal cavity and sinuses: No    Previous nasal surgery: No    Trauma history: Hit in nose with softball as a child    Sleep apnea or snoring history: Both    Allergic rhinitis, sinusitis history: Both- lifelong sinus infections 2-4x per year.     Smoking: No      Past Medical History  He has a past medical history of Hypercholesterolemia.    Surgical History  He has a past surgical history that includes Skin graft (Bilateral, 1961); Finger surgery (2004); and Finger surgery.     Social History  He reports that he has never smoked. He has never been exposed to tobacco smoke. He has never used smokeless tobacco. He reports that he does not currently use alcohol. He reports that he does not use drugs.    Family History  Family History   Problem Relation Name Age of Onset     Hypertension Mother      Cancer Father      Heart attack Mother's Brother      Cancer Paternal Grandmother          Allergies  Patient has no known allergies.    Physical exam    General: Well-developed and well-nourished in appearance.  Skin: No rashes or concerning lesions on the visible portions of the skin.  Eyes: Extraocular movements intact. Visual fields grossly normal.  Ears: Pinna are normal in shape and position. External canals are patent.  Oral Cavity/Oropharynx: Dentition is intact. Mucous membranes moist. No masses or lesions.  Respiratory: No respiratory distress. Quiet breathing without stertor or stridor.  Cardiovascular: Regular rate and rhythm. Warm extremities with equal pulses.  Psych: Normal mood and affect. Judgement and insight appropriate.  Neuro: Alert and oriented. CN II-XII grossly intact. No focal deficits.  Musculoskeletal: Gait intact. Moves all extremities well without apparent deformities.    A comprehensive facial exam was performed with the following highlights:    Nasal skin type: Mod    Internal exam:   Septum: S shaped deviation with left bony spur  Inferior turbinates: Hypertrophic bilaterally  Nasal valve angle: Decreased bilaterally L>R    Intranasal examination performed with limited view on anterior rhinoscopy.    Procedures:    Procedure: Rigid diagnostic nasal endoscopy  Ramses Rodrigez PA-C   Anesthesia: None  Timeout: Performed  Findings: A 0-degree rigid endoscope was passed through the patient's bilateral naris. The first pass was along the floor of the nose to the nasopharynx. The second pass was to the area of the middle meatus. The third pass was to the sphenoethmoidal recess.    Septum: Deviation is S shaped with bilateral obstruction approximately 90% without perforations nor synechia.  Internal Nasal Valve: Angle is reduced, narrowing the nasal airway bilaterally.    Right nasal cavity:  Inferior turbinate: 2+  Inferior meatus: Clear, no discharge, no  polyps/masses/lesions  Middle meatus: Clear, no discharge, no polyps/masses/lesions    Left nasal cavity:  Inferior turbinate: 2+  Inferior meatus: Clear, no discharge, no polyps/masses/lesions  Middle meatus: Clear, no discharge, no polyps/masses/lesions  Nasopharynx: Clear, no discharge, no masses/lesions    Modified Sam Maneuver: Performed with a cotton tipped applicator, markedly improves breathing bilaterally.    Assessment - This patient has a significant mechanical nasal obstruction. The cause is multifactorial with septal deviation with severe internal nasal valve narrowing, turbinate hypertrophy, and static internal nasal valve collapse. There is some dynamic nasal valve collapse as well. Correction of this nasal obstruction will require a septoplasty, repair of nasal valve collapse with structural grafting, and a bilateral turbinate reduction. We discussed the medical necessity of this at length, as well as the risks and limitations of the procedures. All questions were answered.  Plan - Recommend reconstructive septoplasty, nasal valve repair with structural grafting, and inferior turbinate reduction with lateralization.  -Doxy for current sinus symptoms  -CT Sinus to assess for possible COMBO case;   -Virtual Visit with me to review CT; possible referral to SR for COMBO case     JOHN Morris PA-C

## 2025-04-15 ENCOUNTER — HOSPITAL ENCOUNTER (OUTPATIENT)
Dept: RADIOLOGY | Facility: HOSPITAL | Age: 70
Discharge: HOME | End: 2025-04-15
Payer: MEDICARE

## 2025-04-15 DIAGNOSIS — J34.829 NASAL VALVE COLLAPSE: ICD-10-CM

## 2025-04-15 DIAGNOSIS — J01.90 ACUTE NON-RECURRENT SINUSITIS, UNSPECIFIED LOCATION: ICD-10-CM

## 2025-04-15 DIAGNOSIS — R06.89 DIFFICULTY BREATHING: ICD-10-CM

## 2025-04-15 DIAGNOSIS — J34.2 DEVIATED SEPTUM: ICD-10-CM

## 2025-04-15 DIAGNOSIS — J34.3 HYPERTROPHY OF NASAL TURBINATES: ICD-10-CM

## 2025-04-15 PROCEDURE — 70486 CT MAXILLOFACIAL W/O DYE: CPT

## 2025-04-21 ENCOUNTER — PREP FOR PROCEDURE (OUTPATIENT)
Dept: OTOLARYNGOLOGY | Facility: CLINIC | Age: 70
End: 2025-04-21
Payer: MEDICARE

## 2025-04-21 DIAGNOSIS — M95.0 ACQUIRED DEFORMITY OF NOSE: ICD-10-CM

## 2025-04-21 DIAGNOSIS — R09.81 NASAL CONGESTION: Primary | ICD-10-CM

## 2025-04-21 DIAGNOSIS — J34.2 DEVIATED NASAL SEPTUM: ICD-10-CM

## 2025-04-21 DIAGNOSIS — J34.3 HYPERTROPHY OF NASAL TURBINATES: ICD-10-CM

## 2025-05-13 RX ORDER — NORTRIPTYLINE HYDROCHLORIDE 25 MG/1
25 CAPSULE ORAL NIGHTLY
COMMUNITY
End: 2025-05-20 | Stop reason: SDUPTHER

## 2025-05-20 ENCOUNTER — APPOINTMENT (OUTPATIENT)
Dept: PRIMARY CARE | Facility: CLINIC | Age: 70
End: 2025-05-20
Payer: MEDICARE

## 2025-05-20 VITALS
HEART RATE: 64 BPM | DIASTOLIC BLOOD PRESSURE: 85 MMHG | BODY MASS INDEX: 27.44 KG/M2 | TEMPERATURE: 97.3 F | SYSTOLIC BLOOD PRESSURE: 130 MMHG | OXYGEN SATURATION: 95 % | WEIGHT: 208 LBS

## 2025-05-20 DIAGNOSIS — E78.2 MIXED HYPERLIPIDEMIA: ICD-10-CM

## 2025-05-20 DIAGNOSIS — M25.562 CHRONIC PAIN OF BOTH KNEES: Primary | ICD-10-CM

## 2025-05-20 DIAGNOSIS — Z12.5 SCREENING FOR PROSTATE CANCER: ICD-10-CM

## 2025-05-20 DIAGNOSIS — G89.29 CHRONIC PAIN OF BOTH KNEES: Primary | ICD-10-CM

## 2025-05-20 DIAGNOSIS — M54.50 BILATERAL LOW BACK PAIN, UNSPECIFIED CHRONICITY, UNSPECIFIED WHETHER SCIATICA PRESENT: ICD-10-CM

## 2025-05-20 DIAGNOSIS — M25.561 CHRONIC PAIN OF BOTH KNEES: Primary | ICD-10-CM

## 2025-05-20 RX ORDER — TRIAMCINOLONE ACETONIDE 40 MG/ML
40 INJECTION, SUSPENSION INTRA-ARTICULAR; INTRAMUSCULAR ONCE
Status: COMPLETED | OUTPATIENT
Start: 2025-05-20 | End: 2025-05-20

## 2025-05-20 RX ORDER — LIDOCAINE HYDROCHLORIDE 20 MG/ML
2 INJECTION, SOLUTION INFILTRATION; PERINEURAL ONCE
Status: COMPLETED | OUTPATIENT
Start: 2025-05-20 | End: 2025-05-20

## 2025-05-20 RX ORDER — SIMVASTATIN 20 MG/1
20 TABLET, FILM COATED ORAL NIGHTLY
Qty: 90 TABLET | Refills: 3 | Status: SHIPPED | OUTPATIENT
Start: 2025-05-20

## 2025-05-20 RX ORDER — NORTRIPTYLINE HYDROCHLORIDE 25 MG/1
25 CAPSULE ORAL NIGHTLY
Qty: 90 CAPSULE | Refills: 1 | Status: SHIPPED | OUTPATIENT
Start: 2025-05-20

## 2025-05-20 RX ADMIN — TRIAMCINOLONE ACETONIDE 40 MG: 40 INJECTION, SUSPENSION INTRA-ARTICULAR; INTRAMUSCULAR at 09:58

## 2025-05-20 RX ADMIN — LIDOCAINE HYDROCHLORIDE 2 ML: 20 INJECTION, SOLUTION INFILTRATION; PERINEURAL at 09:57

## 2025-05-20 RX ADMIN — LIDOCAINE HYDROCHLORIDE 2 ML: 20 INJECTION, SOLUTION INFILTRATION; PERINEURAL at 09:56

## 2025-05-20 RX ADMIN — TRIAMCINOLONE ACETONIDE 40 MG: 40 INJECTION, SUSPENSION INTRA-ARTICULAR; INTRAMUSCULAR at 09:57

## 2025-05-20 ASSESSMENT — ENCOUNTER SYMPTOMS: HYPERTENSION: 1

## 2025-05-20 NOTE — PROGRESS NOTES
Subjective   Patient ID: Jordan Bray is a 69 y.o. male who presents for Hypertension and Hyperlipidemia.  Hypertension    Hyperlipidemia      Knee pain: better after injection in AUgust    lipids: well controlled on simvastatin.    Problem List[1]    Social Connections: Not on file       Medications Ordered Prior to Encounter[2]     Vitals:    05/20/25 0936   BP: 130/85   Pulse: 64   Temp: 36.3 °C (97.3 °F)   SpO2: 95%     Vitals:    05/20/25 0936   Weight: 94.3 kg (208 lb)       Review of Systems   All other systems reviewed and are negative.      Objective     Physical Exam  Constitutional:       Appearance: Normal appearance. He is well-developed.   HENT:      Head: Atraumatic.   Cardiovascular:      Rate and Rhythm: Normal rate and regular rhythm.      Heart sounds: Normal heart sounds. No murmur heard.  Pulmonary:      Effort: Pulmonary effort is normal.      Breath sounds: Normal breath sounds.   Abdominal:      General: Bowel sounds are normal.      Palpations: Abdomen is soft.   Skin:     General: Skin is warm.   Neurological:      General: No focal deficit present.      Mental Status: He is alert.   Psychiatric:         Mood and Affect: Mood normal.         No visits with results within 2 Month(s) from this visit.   Latest known visit with results is:   Lab on 11/19/2024   Component Date Value Ref Range Status    Glucose 11/19/2024 95  74 - 99 mg/dL Final    Sodium 11/19/2024 138  136 - 145 mmol/L Final    Potassium 11/19/2024 4.4  3.5 - 5.3 mmol/L Final    Chloride 11/19/2024 105  98 - 107 mmol/L Final    Bicarbonate 11/19/2024 27  21 - 32 mmol/L Final    Anion Gap 11/19/2024 10  10 - 20 mmol/L Final    Urea Nitrogen 11/19/2024 18  6 - 23 mg/dL Final    Creatinine 11/19/2024 0.92  0.50 - 1.30 mg/dL Final    eGFR 11/19/2024 89  >60 mL/min/1.73m*2 Final    Calculations of estimated GFR are performed using the 2021 CKD-EPI Study Refit equation without the race variable for the IDMS-Traceable creatinine  methods.  https://jasn.asnjournals.org/content/early/2021/09/22/ASN.9197375457    Calcium 11/19/2024 9.6  8.6 - 10.3 mg/dL Final    Albumin 11/19/2024 4.0  3.4 - 5.0 g/dL Final    Alkaline Phosphatase 11/19/2024 82  33 - 136 U/L Final    Total Protein 11/19/2024 6.4  6.4 - 8.2 g/dL Final    AST 11/19/2024 22  9 - 39 U/L Final    Bilirubin, Total 11/19/2024 0.8  0.0 - 1.2 mg/dL Final    ALT 11/19/2024 17  10 - 52 U/L Final    Patients treated with Sulfasalazine may generate falsely decreased results for ALT.    Hemoglobin A1C 11/19/2024 5.8 (H)  See comment % Final    Estimated Average Glucose 11/19/2024 120  Not Established mg/dL Final    Cholesterol 11/19/2024 128  0 - 199 mg/dL Final          Age      Desirable   Borderline High   High     0-19 Y     0 - 169       170 - 199     >/= 200    20-24 Y     0 - 189       190 - 224     >/= 225         >24 Y     0 - 199       200 - 239     >/= 240   **All ranges are based on fasting samples. Specific   therapeutic targets will vary based on patient-specific   cardiac risk.    Pediatric guidelines reference:Pediatrics 2011, 128(S5).Adult guidelines reference: NCEP ATPIII Guidelines,ADALGISA 2001, 258:2486-97    Venipuncture immediately after or during the administration of Metamizole may lead to falsely low results. Testing should be performed immediately prior to Metamizole dosing.    HDL-Cholesterol 11/19/2024 57.6  mg/dL Final      Age       Very Low   Low     Normal    High    0-19 Y    < 35      < 40     40-45     ----  20-24 Y    ----     < 40      >45      ----        >24 Y      ----     < 40     40-60      >60      Cholesterol/HDL Ratio 11/19/2024 2.2   Final      Ref Values  Desirable  < 3.4  High Risk  > 5.0    LDL Calculated 11/19/2024 54  <=99 mg/dL Final                                Near   Borderline      AGE      Desirable  Optimal    High     High     Very High     0-19 Y     0 - 109     ---    110-129   >/= 130     ----    20-24 Y     0 - 119     ---     120-159   >/= 160     ----      >24 Y     0 -  99   100-129  130-159   160-189     >/=190      VLDL 11/19/2024 16  0 - 40 mg/dL Final    Triglycerides 11/19/2024 82  0 - 149 mg/dL Final    Age              Desirable        Borderline         High        Very High  SEX:B           mg/dL             mg/dL               mg/dL      mg/dL  <=14D                       ----               ----        ----  15D-365D                    ----               ----        ----  1Y-9Y           0-74               75-99             >=100       ----  10Y-19Y        0-89                            >=130       ----  20Y-24Y        0-114             115-149             >=150      ----  >= 25Y         0-149             150-199             200-499    >=500      Venipuncture immediately after or during the administration of Metamizole may lead to falsely low results. Testing should be performed immediately prior to Metamizole dosing.    Non HDL Cholesterol 11/19/2024 70  0 - 149 mg/dL Final          Age       Desirable   Borderline High   High     Very High     0-19 Y     0 - 119       120 - 144     >/= 145    >/= 160    20-24 Y     0 - 149       150 - 189     >/= 190      ----         >24 Y    30 mg/dL above LDL Cholesterol goal      Prostate Specific AG 11/19/2024 0.76  <=4.00 ng/mL Final    Vitamin B12 11/19/2024 555  211 - 911 pg/mL Final       Assessment/Plan   Problem List Items Addressed This Visit           ICD-10-CM    Hyperlipidemia E78.5    Relevant Medications    simvastatin (Zocor) 20 mg tablet    Other Relevant Orders    CBC and Auto Differential    Comprehensive Metabolic Panel    CBC and Auto Differential    Lipid Panel     Other Visit Diagnoses         Codes      Chronic pain of both knees    -  Primary M25.561, M25.562, G89.29    Relevant Orders    Comprehensive Metabolic Panel    CBC and Auto Differential    Lipid Panel      Bilateral low back pain, unspecified chronicity, unspecified whether sciatica  present     M54.50    Relevant Medications    nortriptyline (Pamelor) 25 mg capsule    triamcinolone acetonide (Kenalog-40) injection 40 mg (Completed) (Start on 5/20/2025 10:15 AM)    lidocaine (Xylocaine) 20 mg/mL (2 %) injection 2 mL (Completed) (Start on 5/20/2025 10:15 AM)    triamcinolone acetonide (Kenalog-40) injection 40 mg (Completed) (Start on 5/20/2025 10:15 AM)    lidocaine (Xylocaine) 20 mg/mL (2 %) injection 2 mL (Completed) (Start on 5/20/2025 10:15 AM)    Other Relevant Orders    Comprehensive Metabolic Panel    CBC and Auto Differential    Lipid Panel      Screening for prostate cancer     Z12.5    Relevant Orders    Comprehensive Metabolic Panel    CBC and Auto Differential    Lipid Panel    Prostate Specific Antigen          Obtained verbal consent from patient.  Cleaned area with alcohol swab.  Injected B knee with 1.5 ml lidocaine without epi and 1.5 ml kenalog.  There was minimal bleeding.  Patient had immediate improvement of symptoms.         [1]   Patient Active Problem List  Diagnosis    Essential hypertension    Hyperlipidemia    Obstructive sleep apnea syndrome    Primary osteoarthritis of both knees    Acute non-recurrent sinusitis    Nasal valve collapse    Hypertrophy of nasal turbinates    Difficulty breathing    Deviated septum    Nasal congestion    Deviated nasal septum    Acquired deformity of nose   [2]   Current Outpatient Medications on File Prior to Visit   Medication Sig Dispense Refill    sildenafil (Viagra) 100 mg tablet Take 1 tablet (100 mg) by mouth once daily as needed for erectile dysfunction. 12 tablet 3    timolol (Timoptic) 0.5 % ophthalmic solution Administer 1 drop into the right eye once daily.      [DISCONTINUED] nortriptyline (Pamelor) 25 mg capsule Take 1 capsule (25 mg) by mouth once daily at bedtime.      [DISCONTINUED] simvastatin (Zocor) 20 mg tablet Take 1 tablet (20 mg) by mouth once daily at bedtime. 90 tablet 3     No current facility-administered  medications on file prior to visit.

## 2025-05-22 LAB
BASOPHILS # BLD AUTO: 42 CELLS/UL (ref 0–200)
BASOPHILS NFR BLD AUTO: 0.4 %
EOSINOPHIL # BLD AUTO: 32 CELLS/UL (ref 15–500)
EOSINOPHIL NFR BLD AUTO: 0.3 %
ERYTHROCYTE [DISTWIDTH] IN BLOOD BY AUTOMATED COUNT: 13 % (ref 11–15)
HCT VFR BLD AUTO: 49.1 % (ref 38.5–50)
HGB BLD-MCNC: 16.1 G/DL (ref 13.2–17.1)
LYMPHOCYTES # BLD AUTO: 2877 CELLS/UL (ref 850–3900)
LYMPHOCYTES NFR BLD AUTO: 27.4 %
MCH RBC QN AUTO: 30.5 PG (ref 27–33)
MCHC RBC AUTO-ENTMCNC: 32.8 G/DL (ref 32–36)
MCV RBC AUTO: 93 FL (ref 80–100)
MONOCYTES # BLD AUTO: 1155 CELLS/UL (ref 200–950)
MONOCYTES NFR BLD AUTO: 11 %
NEUTROPHILS # BLD AUTO: 6395 CELLS/UL (ref 1500–7800)
NEUTROPHILS NFR BLD AUTO: 60.9 %
PLATELET # BLD AUTO: 201 THOUSAND/UL (ref 140–400)
PMV BLD REES-ECKER: 10.4 FL (ref 7.5–12.5)
RBC # BLD AUTO: 5.28 MILLION/UL (ref 4.2–5.8)
WBC # BLD AUTO: 10.5 THOUSAND/UL (ref 3.8–10.8)

## 2025-05-23 ENCOUNTER — ANESTHESIA (OUTPATIENT)
Dept: OPERATING ROOM | Facility: CLINIC | Age: 70
End: 2025-05-23
Payer: MEDICARE

## 2025-05-23 ENCOUNTER — HOSPITAL ENCOUNTER (OUTPATIENT)
Facility: CLINIC | Age: 70
Setting detail: OUTPATIENT SURGERY
Discharge: HOME | End: 2025-05-23
Attending: OTOLARYNGOLOGY | Admitting: OTOLARYNGOLOGY
Payer: MEDICARE

## 2025-05-23 ENCOUNTER — ANESTHESIA EVENT (OUTPATIENT)
Dept: OPERATING ROOM | Facility: CLINIC | Age: 70
End: 2025-05-23
Payer: MEDICARE

## 2025-05-23 VITALS
DIASTOLIC BLOOD PRESSURE: 87 MMHG | TEMPERATURE: 96.8 F | SYSTOLIC BLOOD PRESSURE: 145 MMHG | BODY MASS INDEX: 27.44 KG/M2 | HEIGHT: 73 IN | RESPIRATION RATE: 16 BRPM | HEART RATE: 59 BPM | OXYGEN SATURATION: 96 % | WEIGHT: 207.01 LBS

## 2025-05-23 DIAGNOSIS — G89.18 POSTOPERATIVE PAIN: ICD-10-CM

## 2025-05-23 DIAGNOSIS — J34.2 DEVIATED SEPTUM: ICD-10-CM

## 2025-05-23 DIAGNOSIS — J34.2 DEVIATED NASAL SEPTUM: ICD-10-CM

## 2025-05-23 DIAGNOSIS — R09.81 NASAL CONGESTION: ICD-10-CM

## 2025-05-23 DIAGNOSIS — M95.0 ACQUIRED DEFORMITY OF NOSE: Primary | ICD-10-CM

## 2025-05-23 PROCEDURE — 7100000001 HC RECOVERY ROOM TIME - INITIAL BASE CHARGE: Performed by: OTOLARYNGOLOGY

## 2025-05-23 PROCEDURE — 3600000002 HC OR TIME - INITIAL BASE CHARGE - PROCEDURE LEVEL TWO: Performed by: OTOLARYNGOLOGY

## 2025-05-23 PROCEDURE — 2500000004 HC RX 250 GENERAL PHARMACY W/ HCPCS (ALT 636 FOR OP/ED): Performed by: OTOLARYNGOLOGY

## 2025-05-23 PROCEDURE — 3700000002 HC GENERAL ANESTHESIA TIME - EACH INCREMENTAL 1 MINUTE: Performed by: OTOLARYNGOLOGY

## 2025-05-23 PROCEDURE — 7100000010 HC PHASE TWO TIME - EACH INCREMENTAL 1 MINUTE: Performed by: OTOLARYNGOLOGY

## 2025-05-23 PROCEDURE — 30520 REPAIR OF NASAL SEPTUM: CPT | Performed by: OTOLARYNGOLOGY

## 2025-05-23 PROCEDURE — 3700000001 HC GENERAL ANESTHESIA TIME - INITIAL BASE CHARGE: Performed by: OTOLARYNGOLOGY

## 2025-05-23 PROCEDURE — 2500000001 HC RX 250 WO HCPCS SELF ADMINISTERED DRUGS (ALT 637 FOR MEDICARE OP): Performed by: NURSE ANESTHETIST, CERTIFIED REGISTERED

## 2025-05-23 PROCEDURE — 30465 REPAIR NASAL STENOSIS: CPT | Performed by: OTOLARYNGOLOGY

## 2025-05-23 PROCEDURE — 7100000009 HC PHASE TWO TIME - INITIAL BASE CHARGE: Performed by: OTOLARYNGOLOGY

## 2025-05-23 PROCEDURE — 2720000007 HC OR 272 NO HCPCS: Performed by: OTOLARYNGOLOGY

## 2025-05-23 PROCEDURE — 30930 THER FX NASAL INF TURBINATE: CPT | Performed by: OTOLARYNGOLOGY

## 2025-05-23 PROCEDURE — 2500000001 HC RX 250 WO HCPCS SELF ADMINISTERED DRUGS (ALT 637 FOR MEDICARE OP): Performed by: OTOLARYNGOLOGY

## 2025-05-23 PROCEDURE — 7100000002 HC RECOVERY ROOM TIME - EACH INCREMENTAL 1 MINUTE: Performed by: OTOLARYNGOLOGY

## 2025-05-23 PROCEDURE — 2500000005 HC RX 250 GENERAL PHARMACY W/O HCPCS: Performed by: OTOLARYNGOLOGY

## 2025-05-23 PROCEDURE — 20912 REMOVE CARTILAGE FOR GRAFT: CPT | Performed by: OTOLARYNGOLOGY

## 2025-05-23 PROCEDURE — 2500000004 HC RX 250 GENERAL PHARMACY W/ HCPCS (ALT 636 FOR OP/ED): Mod: JZ | Performed by: NURSE ANESTHETIST, CERTIFIED REGISTERED

## 2025-05-23 PROCEDURE — 2500000005 HC RX 250 GENERAL PHARMACY W/O HCPCS: Mod: JZ | Performed by: OTOLARYNGOLOGY

## 2025-05-23 PROCEDURE — 3600000007 HC OR TIME - EACH INCREMENTAL 1 MINUTE - PROCEDURE LEVEL TWO: Performed by: OTOLARYNGOLOGY

## 2025-05-23 PROCEDURE — 30140 RESECT INFERIOR TURBINATE: CPT | Performed by: OTOLARYNGOLOGY

## 2025-05-23 RX ORDER — ONDANSETRON HYDROCHLORIDE 2 MG/ML
4 INJECTION, SOLUTION INTRAVENOUS ONCE AS NEEDED
Status: DISCONTINUED | OUTPATIENT
Start: 2025-05-23 | End: 2025-05-23 | Stop reason: HOSPADM

## 2025-05-23 RX ORDER — METHOCARBAMOL 100 MG/ML
INJECTION, SOLUTION INTRAMUSCULAR; INTRAVENOUS AS NEEDED
Status: DISCONTINUED | OUTPATIENT
Start: 2025-05-23 | End: 2025-05-23

## 2025-05-23 RX ORDER — LIDOCAINE HYDROCHLORIDE 10 MG/ML
0.1 INJECTION, SOLUTION EPIDURAL; INFILTRATION; INTRACAUDAL; PERINEURAL ONCE
Status: DISCONTINUED | OUTPATIENT
Start: 2025-05-23 | End: 2025-05-23 | Stop reason: HOSPADM

## 2025-05-23 RX ORDER — METOCLOPRAMIDE HYDROCHLORIDE 5 MG/ML
10 INJECTION INTRAMUSCULAR; INTRAVENOUS ONCE AS NEEDED
Status: DISCONTINUED | OUTPATIENT
Start: 2025-05-23 | End: 2025-05-23 | Stop reason: HOSPADM

## 2025-05-23 RX ORDER — HYDROMORPHONE HYDROCHLORIDE 1 MG/ML
0.2 INJECTION, SOLUTION INTRAMUSCULAR; INTRAVENOUS; SUBCUTANEOUS EVERY 5 MIN PRN
Status: DISCONTINUED | OUTPATIENT
Start: 2025-05-23 | End: 2025-05-23 | Stop reason: HOSPADM

## 2025-05-23 RX ORDER — TRANEXAMIC ACID 1 G/10ML
INJECTION, SOLUTION INTRAVENOUS AS NEEDED
Status: DISCONTINUED | OUTPATIENT
Start: 2025-05-23 | End: 2025-05-23 | Stop reason: HOSPADM

## 2025-05-23 RX ORDER — SCOPOLAMINE 1 MG/3D
PATCH, EXTENDED RELEASE TRANSDERMAL AS NEEDED
Status: DISCONTINUED | OUTPATIENT
Start: 2025-05-23 | End: 2025-05-23

## 2025-05-23 RX ORDER — LIDOCAINE HYDROCHLORIDE 20 MG/ML
INJECTION, SOLUTION EPIDURAL; INFILTRATION; INTRACAUDAL; PERINEURAL AS NEEDED
Status: DISCONTINUED | OUTPATIENT
Start: 2025-05-23 | End: 2025-05-23

## 2025-05-23 RX ORDER — ONDANSETRON HYDROCHLORIDE 2 MG/ML
INJECTION, SOLUTION INTRAVENOUS AS NEEDED
Status: DISCONTINUED | OUTPATIENT
Start: 2025-05-23 | End: 2025-05-23

## 2025-05-23 RX ORDER — OXYCODONE HYDROCHLORIDE 5 MG/1
5 TABLET ORAL EVERY 6 HOURS PRN
Qty: 16 TABLET | Refills: 0 | Status: SHIPPED | OUTPATIENT
Start: 2025-05-23 | End: 2025-05-27

## 2025-05-23 RX ORDER — MUPIROCIN 20 MG/G
OINTMENT TOPICAL
Qty: 30 G | Refills: 3 | Status: SHIPPED | OUTPATIENT
Start: 2025-05-23

## 2025-05-23 RX ORDER — SODIUM CHLORIDE, SODIUM LACTATE, POTASSIUM CHLORIDE, CALCIUM CHLORIDE 600; 310; 30; 20 MG/100ML; MG/100ML; MG/100ML; MG/100ML
100 INJECTION, SOLUTION INTRAVENOUS CONTINUOUS
Status: SHIPPED | OUTPATIENT
Start: 2025-05-23 | End: 2025-05-23

## 2025-05-23 RX ORDER — OXYMETAZOLINE HCL 0.05 %
SPRAY, NON-AEROSOL (ML) NASAL AS NEEDED
Status: DISCONTINUED | OUTPATIENT
Start: 2025-05-23 | End: 2025-05-23 | Stop reason: HOSPADM

## 2025-05-23 RX ORDER — EPINEPHRINE 1 MG/ML
INJECTION, SOLUTION, CONCENTRATE INTRAVENOUS AS NEEDED
Status: DISCONTINUED | OUTPATIENT
Start: 2025-05-23 | End: 2025-05-23 | Stop reason: HOSPADM

## 2025-05-23 RX ORDER — PHENYLEPHRINE HCL IN 0.9% NACL 0.4MG/10ML
SYRINGE (ML) INTRAVENOUS AS NEEDED
Status: DISCONTINUED | OUTPATIENT
Start: 2025-05-23 | End: 2025-05-23

## 2025-05-23 RX ORDER — CEPHALEXIN 500 MG/1
500 CAPSULE ORAL 4 TIMES DAILY
Qty: 40 CAPSULE | Refills: 0 | Status: SHIPPED | OUTPATIENT
Start: 2025-05-23 | End: 2025-06-02

## 2025-05-23 RX ORDER — OXYCODONE HYDROCHLORIDE 5 MG/1
5 TABLET ORAL ONCE AS NEEDED
Status: DISCONTINUED | OUTPATIENT
Start: 2025-05-23 | End: 2025-05-23 | Stop reason: HOSPADM

## 2025-05-23 RX ORDER — CELECOXIB 200 MG/1
CAPSULE ORAL AS NEEDED
Status: DISCONTINUED | OUTPATIENT
Start: 2025-05-23 | End: 2025-05-23

## 2025-05-23 RX ORDER — DEXMEDETOMIDINE IN 0.9 % NACL 20 MCG/5ML
SYRINGE (ML) INTRAVENOUS AS NEEDED
Status: DISCONTINUED | OUTPATIENT
Start: 2025-05-23 | End: 2025-05-23

## 2025-05-23 RX ORDER — SODIUM CHLORIDE 0.9 G/100ML
INJECTION, SOLUTION IRRIGATION AS NEEDED
Status: DISCONTINUED | OUTPATIENT
Start: 2025-05-23 | End: 2025-05-23 | Stop reason: HOSPADM

## 2025-05-23 RX ORDER — CEFAZOLIN 1 G/1
INJECTION, POWDER, FOR SOLUTION INTRAVENOUS AS NEEDED
Status: DISCONTINUED | OUTPATIENT
Start: 2025-05-23 | End: 2025-05-23

## 2025-05-23 RX ORDER — LABETALOL HYDROCHLORIDE 5 MG/ML
INJECTION, SOLUTION INTRAVENOUS AS NEEDED
Status: DISCONTINUED | OUTPATIENT
Start: 2025-05-23 | End: 2025-05-23

## 2025-05-23 RX ORDER — SUCCINYLCHOLINE CHLORIDE 20 MG/ML
INJECTION INTRAMUSCULAR; INTRAVENOUS AS NEEDED
Status: DISCONTINUED | OUTPATIENT
Start: 2025-05-23 | End: 2025-05-23

## 2025-05-23 RX ORDER — MAGNESIUM SULFATE HEPTAHYDRATE 500 MG/ML
INJECTION, SOLUTION INTRAMUSCULAR; INTRAVENOUS AS NEEDED
Status: DISCONTINUED | OUTPATIENT
Start: 2025-05-23 | End: 2025-05-23

## 2025-05-23 RX ORDER — DEXAMETHASONE SODIUM PHOSPHATE 10 MG/ML
INJECTION INTRAMUSCULAR; INTRAVENOUS AS NEEDED
Status: DISCONTINUED | OUTPATIENT
Start: 2025-05-23 | End: 2025-05-23

## 2025-05-23 RX ORDER — PROPOFOL 10 MG/ML
INJECTION, EMULSION INTRAVENOUS AS NEEDED
Status: DISCONTINUED | OUTPATIENT
Start: 2025-05-23 | End: 2025-05-23

## 2025-05-23 RX ORDER — MUPIROCIN 20 MG/G
OINTMENT TOPICAL AS NEEDED
Status: DISCONTINUED | OUTPATIENT
Start: 2025-05-23 | End: 2025-05-23 | Stop reason: HOSPADM

## 2025-05-23 RX ORDER — ALBUTEROL SULFATE 0.83 MG/ML
2.5 SOLUTION RESPIRATORY (INHALATION) ONCE AS NEEDED
Status: DISCONTINUED | OUTPATIENT
Start: 2025-05-23 | End: 2025-05-23 | Stop reason: HOSPADM

## 2025-05-23 RX ORDER — ONDANSETRON 4 MG/1
4 TABLET, ORALLY DISINTEGRATING ORAL EVERY 8 HOURS PRN
Qty: 20 TABLET | Refills: 0 | Status: SHIPPED | OUTPATIENT
Start: 2025-05-23

## 2025-05-23 RX ORDER — FENTANYL CITRATE 50 UG/ML
INJECTION, SOLUTION INTRAMUSCULAR; INTRAVENOUS AS NEEDED
Status: DISCONTINUED | OUTPATIENT
Start: 2025-05-23 | End: 2025-05-23

## 2025-05-23 RX ORDER — LIDOCAINE HYDROCHLORIDE AND EPINEPHRINE 10; 10 UG/ML; MG/ML
INJECTION, SOLUTION INFILTRATION; PERINEURAL AS NEEDED
Status: DISCONTINUED | OUTPATIENT
Start: 2025-05-23 | End: 2025-05-23 | Stop reason: HOSPADM

## 2025-05-23 RX ORDER — ACETAMINOPHEN 10 MG/ML
INJECTION, SOLUTION INTRAVENOUS AS NEEDED
Status: DISCONTINUED | OUTPATIENT
Start: 2025-05-23 | End: 2025-05-23

## 2025-05-23 RX ADMIN — METHOCARBAMOL 500 MG: 100 INJECTION, SOLUTION INTRAMUSCULAR; INTRAVENOUS at 11:55

## 2025-05-23 RX ADMIN — CELECOXIB 200 MG: 200 CAPSULE ORAL at 11:30

## 2025-05-23 RX ADMIN — Medication 8 MCG: at 11:57

## 2025-05-23 RX ADMIN — LIDOCAINE HYDROCHLORIDE 60 MG: 20 INJECTION, SOLUTION EPIDURAL; INFILTRATION; INTRACAUDAL; PERINEURAL at 13:04

## 2025-05-23 RX ADMIN — MAGNESIUM SULFATE HEPTAHYDRATE 2 G: 500 INJECTION, SOLUTION INTRAMUSCULAR; INTRAVENOUS at 11:58

## 2025-05-23 RX ADMIN — PROPOFOL 50 MG: 10 INJECTION, EMULSION INTRAVENOUS at 11:58

## 2025-05-23 RX ADMIN — ACETAMINOPHEN 1000 MG: 10 INJECTION, SOLUTION INTRAVENOUS at 12:02

## 2025-05-23 RX ADMIN — SODIUM CHLORIDE, POTASSIUM CHLORIDE, SODIUM LACTATE AND CALCIUM CHLORIDE: 600; 310; 30; 20 INJECTION, SOLUTION INTRAVENOUS at 11:47

## 2025-05-23 RX ADMIN — ONDANSETRON 4 MG: 2 INJECTION INTRAMUSCULAR; INTRAVENOUS at 13:04

## 2025-05-23 RX ADMIN — Medication 40 MCG: at 12:46

## 2025-05-23 RX ADMIN — PROPOFOL 200 MG: 10 INJECTION, EMULSION INTRAVENOUS at 11:47

## 2025-05-23 RX ADMIN — SUCCINYLCHOLINE CHLORIDE 100 MG: 20 INJECTION, SOLUTION INTRAMUSCULAR; INTRAVENOUS at 11:47

## 2025-05-23 RX ADMIN — DEXAMETHASONE SODIUM PHOSPHATE 10 MG: 10 INJECTION, SOLUTION INTRAMUSCULAR; INTRAVENOUS at 11:53

## 2025-05-23 RX ADMIN — Medication 80 MCG: at 12:59

## 2025-05-23 RX ADMIN — CEFAZOLIN 2 G: 1 INJECTION, POWDER, FOR SOLUTION INTRAMUSCULAR; INTRAVENOUS at 11:55

## 2025-05-23 RX ADMIN — LIDOCAINE HYDROCHLORIDE 40 MG: 20 INJECTION, SOLUTION EPIDURAL; INFILTRATION; INTRACAUDAL; PERINEURAL at 12:04

## 2025-05-23 RX ADMIN — LABETALOL HYDROCHLORIDE 10 MG: 5 INJECTION INTRAVENOUS at 12:27

## 2025-05-23 RX ADMIN — Medication 8 MCG: at 13:01

## 2025-05-23 RX ADMIN — SCOPOLAMINE 1 PATCH: 1.5 PATCH, EXTENDED RELEASE TRANSDERMAL at 11:30

## 2025-05-23 RX ADMIN — Medication 4 MCG: at 12:37

## 2025-05-23 RX ADMIN — PROPOFOL 50 MG: 10 INJECTION, EMULSION INTRAVENOUS at 12:27

## 2025-05-23 RX ADMIN — LIDOCAINE HYDROCHLORIDE 100 MG: 20 INJECTION, SOLUTION EPIDURAL; INFILTRATION; INTRACAUDAL; PERINEURAL at 11:47

## 2025-05-23 RX ADMIN — PROPOFOL 50 MCG/KG/MIN: 10 INJECTION, EMULSION INTRAVENOUS at 11:52

## 2025-05-23 RX ADMIN — PROPOFOL 50 MG: 10 INJECTION, EMULSION INTRAVENOUS at 12:21

## 2025-05-23 RX ADMIN — FENTANYL CITRATE 100 MCG: 50 INJECTION, SOLUTION INTRAMUSCULAR; INTRAVENOUS at 11:47

## 2025-05-23 SDOH — HEALTH STABILITY: MENTAL HEALTH: CURRENT SMOKER: 0

## 2025-05-23 ASSESSMENT — COLUMBIA-SUICIDE SEVERITY RATING SCALE - C-SSRS
2. HAVE YOU ACTUALLY HAD ANY THOUGHTS OF KILLING YOURSELF?: NO
1. IN THE PAST MONTH, HAVE YOU WISHED YOU WERE DEAD OR WISHED YOU COULD GO TO SLEEP AND NOT WAKE UP?: NO
6. HAVE YOU EVER DONE ANYTHING, STARTED TO DO ANYTHING, OR PREPARED TO DO ANYTHING TO END YOUR LIFE?: NO

## 2025-05-23 ASSESSMENT — PAIN SCALES - GENERAL
PAINLEVEL_OUTOF10: 0 - NO PAIN

## 2025-05-23 ASSESSMENT — PAIN - FUNCTIONAL ASSESSMENT
PAIN_FUNCTIONAL_ASSESSMENT: 0-10

## 2025-05-23 NOTE — DISCHARGE INSTRUCTIONS
Facial Plastic & Reconstructive Surgery    NASAL SURGERY POST-OPERATIVE  PATIENT INSTRUCTIONS     Thank you for choosing the [facility] Facial Plastic and Reconstructive Surgery Team for your upcoming surgery.  We want you to have the best and safest experience possible.  If you have any questions or concerns, please do not hesitate to contact our office.  We look forward to helping you achieve your goals!      Surgery and General Postoperative Expectations:   -Most incisions for surgery are concealed within the nose.  Frequently with rhinoplasty and sometimes with nasal airway surgery (septoplasty), an additional external incision is made in the columella (tissue that links the nasal tip to the nasal base, and separates the nostrils); this incision typically heals very well.    -Bruising of the nose and around the eyes is normal and typically resolves about 1-2 weeks after surgery.    -Most patients return to school or work in 7-10 days, after their first postoperative appointment.    -Much of the swelling resolves over the first several weeks after surgery.  Most patients are comfortable at school, work, and social events after about a week or so. It takes over a year for all of the swelling in the nose to resolve.  This is a slow process and the final result reveals itself about 15 months after the operation.      At Home after Surgery:  Head Elevation: You may sleep on your side; During waking hours, keep your head elevated (the height of 2-3 pillows is appropriate) for 3 days to help with swelling.     Ice: Apply cold compresses to the cheeks, eyes and forehead, up to 20 minutes of each hour while awake after surgery, for the first 48 hours.  This will help reduce swelling and bruising. We also recommend ARNICA GEL for undereye/cheek bruising postoperatively.     Nasal Packing: If you have splints inside the nose that are sutured into place, they will be removed at your follow-up appointment.  [Mother] : mother     Nasal Care: Use nasal saline spray, 2 sprays to each nostril three times a day while awake.  This will help keep the nasal passages moist and prevent scabbing in the nose.  It is normal to feel congested for several weeks after surgery.  Do not blow your nose for one week after surgery.  NO NOSEBLOWING for 2 weeks.    Incision/Cast Care:   NOSE: Spray 2 sprays per nostril 3x daily with saline spray (OCEAN), followed by Mupirocin or Bacitracin using clean hands three times a day.  The nasal incisions will heal most optimally if it is kept moist and clean.  You may use hydrogen peroxide on Q-tips to gently clean any crusts.  Do not rub; gently dab the incisions. If you have a cast or dressing on the outside of your nose, this will remain in place until your follow-up appointment.  If the cast does fall off, do not worry.  Tape it to your nose at nighttime while you sleep and if/when you wear glasses.    CHEST/RIB: If we used your own rib cartilage, you will notice a dressing below your breast.  Please wait 24 hours to remove this dressing.  Following removal, you must wash it 2x daily (first thing in the morning, last thing at night) with baby shampoo and warm water, pat dry, and apply a thin amount of Mupirocin to the entire length of the incision.  DO NOT cover it after your initial dressing is removed.  Keep it uncovered day and night.      Shower: You may shower 24 hours after surgery.  Do not let the shower spray hit your face/nose directly and do not soak your face in water.  If you have a cast or dressing on the outside of the nose, it is OK for it to get wet. Towel blot your nose/cast after your shower.    Bleeding: Most patients have mild, active bleeding the first night (saturating a gauze underneath your nose about every hour).  Some blood-tinged drainage is normal for 1-2 weeks after surgery.  Afrin nasal spray may be helpful for bleeding after surgery but should not be used for more than 3 days.   [Parents] : parents Excessive bleeding that does not stop is not expected; please call the office or seek medical attention if this occurs.    Medications: Please refer to the separate recommended medication regimen from your surgeon.     Activity: Resume normal activities of daily living, as you feel able.  However, avoid strenuous activity and heavy lifting (more than 10-15 lbs) for 4 weeks after surgery.  Light activity such as walking may be resumed after 1 week after surgery.  Sport activities may be resumed 1 month after surgery but try to protect your nose as it is still healing.    Seek Medical Attention: Call the office or seek medical attention if you develop fever greater than 101 degrees, excessive bleeding, excessive pain that is not well-controlled, skin rash, visual disturbances, or other unusual symptoms.     Follow-Up Care:  First Appointment: You will return approximately one week after surgery for a post-operative appointment. If need, suture and cast/dressing will be removed at that visit. There are additional sutures inside your nose that will dissolve on their own.   PLEASE MAKE SURE TO EAT PRIOR TO YOUR POSTOP APPOINTMENT; ONLY take an OXYCODONE if you are experiencing 7/10 or more pain that day.    Postoperative Healing: Your nose will be swollen and will remain so for several weeks.  It is important to keep in mind that although much of the swelling resolves over the first several weeks after surgery, it takes 12 to 15 months for all of the swelling in the nose to resolve.      Additional Appointments: Ideally, we would like to see you about 1-2 months after surgery to examine the healing. After this, the follow-up is quite variable, and depends on how you are doing and feeling. Please call the office at any time if you have any questions or concerns and would like to be seen sooner than your next scheduled visit.      Dr. Santizo and Dr. Muniz: [phone]  Dr. Jonathan Frankel: [phone]  [place]  [facility] Denise & Ana Jenkins, [phone]  Evenings/Weekends Emergency Dr. Santizo/Dr. Muniz: [phone]  - please ask for the ENT resident on-call  Evenings/Weekends Emergency Dr. Frankel: [phone]      Tylenol was given at 12:00pm , may take next dose after 6:00pm  today if needed    Celebrex was given at 11:30am, may not take any NSAIDs like Ibuprofen/Motrin until tomorrow after 11:30 am    Scopalamine patch may stay on for 72 hrs.  Remove patch, discard away form pets, children.  Do not touch eyes!  Wash hands thoroughly

## 2025-05-23 NOTE — ANESTHESIA PREPROCEDURE EVALUATION
Patient: Jordan Bray    Procedure Information       Date/Time: 05/23/25 1245    Procedure: SEPTOPLASTY, TURBINATE REDUCTION, NASAL VALVE REPAIR (Bilateral)    Location: [facility] OR 04 / [facility]    Surgeons: Jamar Santizo MD            Relevant Problems   Cardiac   (+) Essential hypertension   (+) Hyperlipidemia      Musculoskeletal   (+) Primary osteoarthritis of both knees      HEENT   (+) Acute non-recurrent sinusitis     Hypercholesterolemia    Narcolepsy    Sleep apnea    Narcolepsy        Clinical information reviewed: no acute illnesses    Tobacco  Allergies  Meds   Med Hx  Surg Hx   Fam Hx  Soc Hx        NPO Detail:  NPO/Void Status  Date of Last Liquid: 05/22/25  Time of Last Liquid: 2300  Date of Last Solid: 05/22/25  Time of Last Solid: 2300         Physical Exam    Airway  Mallampati: II  TM distance: >3 FB  Neck ROM: full  Mouth opening: 3 or more finger widths     Cardiovascular   Rhythm: regular     Dental    Pulmonary    Abdominal            Anesthesia Plan    History of general anesthesia?: yes  History of complications of general anesthesia?: no    ASA 3     general     The patient is not a current smoker.    intravenous induction   Anesthetic plan and risks discussed with patient and spouse.    Plan discussed with CAA and CRNA.

## 2025-05-23 NOTE — H&P
History Of Present Illness  Jordan Bray is a 69 y.o. male presenting with nasal airway obstruction, here for nasal surgery.     Past Medical History  He has a past medical history of Hypercholesterolemia and Narcolepsy.    Surgical History  He has a past surgical history that includes Skin graft (Bilateral, 1961); Finger surgery (2004); and Finger surgery.     Social History  He reports that he has never smoked. He has never been exposed to tobacco smoke. He has never used smokeless tobacco. He reports that he does not currently use alcohol. He reports that he does not use drugs.    Family History  Family History[1]     Allergies  Patient has no known allergies.    ROS negative except what is otherwise specified in the HPI.     HENT:      Head: Normocephalic and atraumatic.   Eyes:      Conjunctiva/sclera: Conjunctivae normal.      Pupils: Pupils are equal, round, and reactive to light.   Cardiovascular:      Rate and Rhythm: Normal rate and regular rhythm.   Pulmonary:      Effort: Pulmonary effort is normal. No respiratory distress.      Breath sounds: Normal breath sounds.   Abdominal:      General: Bowel sounds are normal.      Palpations: Abdomen is soft.   Musculoskeletal:      Cervical back: Normal range of motion and neck supple.   Skin:     General: Skin is warm and dry.   Neurological:      Mental Status: Alert and oriented to person, place, and time.      Cranial Nerves: No cranial nerve deficit.      Coordination: Coordination normal.   Psychiatric:         Mood and Affect: Affect normal.       Last Recorded Vitals  There were no vitals taken for this visit.    Relevant Results        Scheduled medications  Scheduled Medications[2]  Continuous medications  Continuous Medications[3]  PRN medications  PRN Medications[4]       Assessment/Plan   Assessment & Plan  Hypertrophy of nasal turbinates    Nasal congestion    Deviated nasal septum    Acquired deformity of nose    PLAN: SEPTOPLASTY, TURBINATE  REDUCTION, NASAL VALVE REPAIR        I spent 15 minutes in the professional and overall care of this patient.      Ramses Rodrigez PA-C         [1]   Family History  Problem Relation Name Age of Onset    Hypertension Mother      Cancer Father      Heart attack Mother's Brother      Cancer Paternal Grandmother     [2] [3] [4]

## 2025-05-23 NOTE — ANESTHESIA PROCEDURE NOTES
Airway  Date/Time: 5/23/2025 11:50 AM  Reason: elective    Airway not difficult    Staffing  Performed: CRNA   Authorized by: Alex King MD    Performed by: SULAIMAN Segura-LYNSEY  Patient location during procedure: OR    Patient Condition  Indications for airway management: anesthesia and airway protection  Patient position: sniffing  MILS maintained throughout  Sedation level: deep     Final Airway Details   Preoxygenated: yes  Final airway type: endotracheal airway  Successful airway: ETT  Cuffed: yes   Successful intubation technique: video laryngoscopy  Blade size: #4  ETT size (mm): 7.5  Cormack-Lehane Classification: grade I - full view of glottis  Placement verified by: chest auscultation and capnometry   Measured from: lips  ETT to lips (cm): 24  Number of attempts at approach: 1

## 2025-05-23 NOTE — ANESTHESIA POSTPROCEDURE EVALUATION
Patient: Jordan Bray    Procedure Summary       Date: 05/23/25 Room / Location: [facility] 04 [facility]    Anesthesia Start: 1144 Anesthesia Stop: 1330    Procedure: SEPTOPLASTY, TURBINATE REDUCTION, NASAL VALVE REPAIR (Bilateral) Diagnosis:       Nasal congestion      Deviated nasal septum      Hypertrophy of nasal turbinates      Acquired deformity of nose      (Nasal congestion [R09.81])      (Deviated nasal septum [J34.2])      (Hypertrophy of nasal turbinates [J34.3])      (Acquired deformity of nose [M95.0])    Surgeons: Jamar Santizo MD Responsible Provider: Alex King MD    Anesthesia Type: general ASA Status: 3            Anesthesia Type: general    Vitals Value Taken Time   /59 05/23/25 13:52   Temp 36 °C (96.8 °F) 05/23/25 13:52   Pulse 58 05/23/25 13:52   Resp 16 05/23/25 13:52   SpO2 93 % 05/23/25 13:52       Anesthesia Post Evaluation    Patient location during evaluation: PACU  Patient participation: complete - patient participated  Level of consciousness: awake  Pain management: satisfactory to patient  Multimodal analgesia pain management approach  Airway patency: patent  Cardiovascular status: acceptable  Respiratory status: acceptable  Hydration status: acceptable  Postoperative Nausea and Vomiting: none  Comments: Did well        No notable events documented.

## 2025-05-28 NOTE — OP NOTE
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]     Procedures  SEPTOPLASTY, TURBINATE REDUCTION, NASAL VALVE REPAIR  56386 - AL SEPTOPLASTY/SUBMUCOUS RESECJ W/WO CARTILAGE GRF    SEPTOPLASTY, TURBINATE REDUCTION, NASAL VALVE REPAIR  17283 - AL CARTILAGE GRAFT NASAL SEPTUM    SEPTOPLASTY, TURBINATE REDUCTION, NASAL VALVE REPAIR  58406 - AL REPAIR NASAL VESTIBULAR STENOSIS    SEPTOPLASTY, TURBINATE REDUCTION, NASAL VALVE REPAIR  18170 - AL FRACTURE NASAL INFERIOR TURBINATE THERAPEUTIC    SEPTOPLASTY, TURBINATE REDUCTION, NASAL VALVE REPAIR  87006 - AL SUBMUCOSAL RESECTION TURBINATES BILATERALLY      Surgeons      * Jamar Santizo - Primary    Resident/Fellow/Other Assistant:  Surgeons and Role:  * No surgeons found with a matching role *    Staff:   Circulator: Tami Ambrose Person: Efrain Garcia Circulator: Carmne    Anesthesia Staff: Anesthesiologist: Alex King MD  CRNA: SULAIMAN Segura-CRNA  C-AA: JENI Gamboa    Procedure Summary  Anesthesia: General  ASA: III  Estimated Blood Loss: 10 mL  Intra-op Medications:   Administrations occurring from 1245 to 1515 on 25:   Medication Name Total Dose   lactated Ringer's infusion Cannot be calculated   dexMEDETOMidine 4 mcg/mL in NS syringe 8 mcg   LR bolus Cannot be calculated   lidocaine PF (Xylocaine-MPF) local injection 2 % 60 mg   ondansetron (Zofran) 2 mg/mL injection 4 mg   phenylephrine 40 mcg/mL syringe 10 mL 120 mcg    propofol (Diprivan) injection 10 mg/mL 63.38 mg              Anesthesia Record               Intraprocedure I/O Totals          Intake    LR bolus 800.00 mL    Total Intake 800 mL          Specimen: No specimens collected              Drains and/or Catheters: * None in log *    Tourniquet Times:         Implants:     Findings: SEE OP NOTE    Indications: Jordan Bray is an 69 y.o. 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     The patient was seen in the preoperative area. The risks, benefits, complications, treatment options, non-operative alternatives, expected recovery and outcomes were discussed with the patient. The possibilities of reaction to medication, pulmonary aspiration, injury to surrounding structures, bleeding, recurrent infection, the need for additional procedures, failure to diagnose a condition, and creating a complication requiring transfusion or operation were discussed with the patient. The patient concurred with the proposed plan, giving informed consent.  The site of surgery was properly noted/marked if necessary per policy. The patient has been actively warmed in preoperative area. Preoperative antibiotics have been ordered and given within 1 hours of incision. Venous thrombosis prophylaxis     Procedure Details:     The patient was brought to the operating room and placed in the supine position, then intubated under general anesthesia.  The nose was injected with 1% lidocaine with epinephrine and then packed with decongestant-soaked pledgets.  The face was prepped and draped in the usual sterile fashion.  A left hemitransfixion incision was performed and mucoperichondrial flaps were elevated on the left and subsequently the right after crossing over the cartilage.  The deviated septum was treated by removing deformed quadrangular cartilage and bony  septum.    Composite bone and septal cartilage was harvested, taking care to preserve a >1.0 cm L-shaped strut.     To optimize the shape and position of the lateral crura, the vestibular lining underlying the lateral crura was elevated on each side and lateral crural strut grafts were placed. These were fashioned from the bone and cartilage.    Bilateral inferior turbinate reduction and lateralization was then performed.  A suction coblation device was utilized for submucous resection bilaterally.  A [place] elevator was then used to outfracture the inferior turbinate bilaterally.     We then turned our attention to closure.  Meticulous closure was performed using gut suture in a simple, interrupted fashion.  The nose was gently cleansed with sterile saline and an external nasal splint was placed in the usual fashion.  This concluded the goals of the procedure.  The patient was turned over to Anesthesia, extubated, and transferred to the PACU.    Evidence of Infection: No   Complications:  None; patient tolerated the procedure well.    Disposition: PACU - hemodynamically stable.  Condition: stable     Attending Attestation: I was present and scrubbed for the entire procedure.    Jamar Santizo  Phone Number: [phone]

## 2025-06-02 NOTE — PROGRESS NOTES
Facial Plastic & Reconstructive Surgery      POV s/p nasal surgery on 5/23/25    Doing well, endorses improved breathing bilaterally  Continues salt water sprays and ointment to nares    Nasal splint removed  Septum midline  Nasal airway patent  Incisions c/d/I    Plan:  Continue nasal saline sprays and ointment to nares  RTC 4-6 months or sooner if needed     Ramses Rodrigez PA-C

## 2025-06-04 ENCOUNTER — APPOINTMENT (OUTPATIENT)
Dept: OTOLARYNGOLOGY | Facility: CLINIC | Age: 70
End: 2025-06-04
Payer: MEDICARE

## 2025-06-04 VITALS — BODY MASS INDEX: 27.31 KG/M2 | WEIGHT: 207 LBS

## 2025-06-04 DIAGNOSIS — J34.829 NASAL VALVE COLLAPSE: ICD-10-CM

## 2025-06-04 DIAGNOSIS — J34.2 DEVIATED SEPTUM: ICD-10-CM

## 2025-06-04 DIAGNOSIS — R06.89 DIFFICULTY BREATHING: Primary | ICD-10-CM

## 2025-06-04 PROCEDURE — 1036F TOBACCO NON-USER: CPT | Performed by: PHYSICIAN ASSISTANT

## 2025-06-04 PROCEDURE — 99024 POSTOP FOLLOW-UP VISIT: CPT | Performed by: PHYSICIAN ASSISTANT

## 2025-06-04 PROCEDURE — 1159F MED LIST DOCD IN RCRD: CPT | Performed by: PHYSICIAN ASSISTANT

## 2025-06-18 ENCOUNTER — APPOINTMENT (OUTPATIENT)
Dept: OTOLARYNGOLOGY | Facility: CLINIC | Age: 70
End: 2025-06-18
Payer: MEDICARE

## 2025-06-30 ENCOUNTER — APPOINTMENT (OUTPATIENT)
Dept: OTOLARYNGOLOGY | Facility: CLINIC | Age: 70
End: 2025-06-30
Payer: MEDICARE

## 2025-06-30 VITALS — BODY MASS INDEX: 27.43 KG/M2 | WEIGHT: 207 LBS | HEIGHT: 73 IN

## 2025-06-30 DIAGNOSIS — J34.2 DEVIATED SEPTUM: Primary | ICD-10-CM

## 2025-06-30 DIAGNOSIS — R06.89 DIFFICULTY BREATHING: ICD-10-CM

## 2025-06-30 DIAGNOSIS — J34.89 NASAL CRUSTING: ICD-10-CM

## 2025-06-30 DIAGNOSIS — J34.829 NASAL VALVE COLLAPSE: ICD-10-CM

## 2025-06-30 PROCEDURE — 3008F BODY MASS INDEX DOCD: CPT | Performed by: PHYSICIAN ASSISTANT

## 2025-06-30 PROCEDURE — 99024 POSTOP FOLLOW-UP VISIT: CPT | Performed by: PHYSICIAN ASSISTANT

## 2025-06-30 PROCEDURE — 1159F MED LIST DOCD IN RCRD: CPT | Performed by: PHYSICIAN ASSISTANT

## 2025-06-30 NOTE — PROGRESS NOTES
Facial Plastic & Reconstructive Surgery      POV s/p nasal surgery on 5/23/25    Doing well, endorses congestion of left side > right side and dry cough since surgery  Continues salt water sprays and ointment to nares  Using claritin, sudafed and robitussin with minimal improvement of symptoms    Septum midline  Nasal airway patent  Incisions c/d/I  Nasal valve graft on left side appears to have migrated out of pocket a/w swelling around graft, copious crusting removed from left vestibule              Plan:  Continue nasal saline sprays and ointment to nares  RTC in two days for eval with CR, or sooner if needed     Ramses Rodrigez PA-C

## 2025-07-01 NOTE — PROGRESS NOTES
Facial Plastic & Reconstructive Surgery    7/2/25  POV s/p nasal surgery on 5/23/25    Doing well, endorses congestion of left side > right side and dry cough since surgery  Continues salt water sprays and ointment to nares  Using claritin, sudafed and robitussin with minimal improvement of symptoms    Septum midline  Nasal airway patent  Incisions c/d/I  Nasal valve graft on left side removed today in clinic by Dr. Santizo.  Xeroform placed into left nare.      Plan:  Continue nasal saline sprays and ointment to nares  RTC in one week to evaluate if another week of xeroform is needed, per ESTEE Rodrigez PA-C

## 2025-07-02 ENCOUNTER — OFFICE VISIT (OUTPATIENT)
Dept: OTOLARYNGOLOGY | Facility: CLINIC | Age: 70
End: 2025-07-02
Payer: MEDICARE

## 2025-07-02 VITALS — BODY MASS INDEX: 27.01 KG/M2 | WEIGHT: 203.8 LBS | HEIGHT: 73 IN

## 2025-07-02 DIAGNOSIS — J34.829 NASAL VALVE COLLAPSE: ICD-10-CM

## 2025-07-02 DIAGNOSIS — J34.3 HYPERTROPHY OF NASAL TURBINATES: ICD-10-CM

## 2025-07-02 DIAGNOSIS — J34.2 DEVIATED SEPTUM: Primary | ICD-10-CM

## 2025-07-02 DIAGNOSIS — J34.89 NASAL CRUSTING: ICD-10-CM

## 2025-07-02 PROCEDURE — 99024 POSTOP FOLLOW-UP VISIT: CPT | Performed by: PHYSICIAN ASSISTANT

## 2025-07-02 PROCEDURE — 1159F MED LIST DOCD IN RCRD: CPT | Performed by: PHYSICIAN ASSISTANT

## 2025-07-02 PROCEDURE — 3008F BODY MASS INDEX DOCD: CPT | Performed by: PHYSICIAN ASSISTANT

## 2025-07-08 NOTE — PROGRESS NOTES
Facial Plastic & Reconstructive Surgery    7/10/25  POV s/p nasal surgery on 5/23/25    Doing well, endorses congestion of left side > right side and dry cough since surgery  Continues salt water sprays and ointment to nares  Using claritin, sudafed and robitussin with minimal improvement of symptoms    Septum midline  Nasal airway patent  Incisions c/d/I  Nasal valve graft on left side removed today in clinic by Dr. Santizo.  Xeroform placed into left nare.      Plan:  Continue nasal saline sprays and ointment to nares

## 2025-07-10 ENCOUNTER — APPOINTMENT (OUTPATIENT)
Facility: CLINIC | Age: 70
End: 2025-07-10
Payer: MEDICARE

## 2025-07-10 DIAGNOSIS — R09.81 NASAL CONGESTION: Primary | ICD-10-CM

## 2025-07-10 PROCEDURE — 1159F MED LIST DOCD IN RCRD: CPT | Performed by: PHYSICIAN ASSISTANT

## 2025-07-10 PROCEDURE — 1126F AMNT PAIN NOTED NONE PRSNT: CPT | Performed by: PHYSICIAN ASSISTANT

## 2025-07-10 PROCEDURE — 99024 POSTOP FOLLOW-UP VISIT: CPT | Performed by: PHYSICIAN ASSISTANT

## 2025-07-10 ASSESSMENT — PAIN SCALES - GENERAL: PAINLEVEL_OUTOF10: 0-NO PAIN

## 2025-09-17 ENCOUNTER — APPOINTMENT (OUTPATIENT)
Dept: OTOLARYNGOLOGY | Facility: CLINIC | Age: 70
End: 2025-09-17
Payer: MEDICARE

## 2025-11-20 ENCOUNTER — APPOINTMENT (OUTPATIENT)
Dept: PRIMARY CARE | Facility: CLINIC | Age: 70
End: 2025-11-20
Payer: MEDICARE

## (undated) DEVICE — SYRINGE, 20 CC, LUER LOCK, MONOJECT, W/O CAP, LF

## (undated) DEVICE — CONTAINER STERILE SPECIMEN 90ML, STERILE

## (undated) DEVICE — STRIP, SKIN CLOSURE, STERI STRIP, REINFORCED, 0.5 X 4 IN

## (undated) DEVICE — NEEDLE, MICRODISSECTION STR 4CM

## (undated) DEVICE — CATHETER, DRAINAGE, NASOGASTRIC, SUMP, SALEM, W/ANTI-REFLUX VALVE, 18 FR, 48 IN

## (undated) DEVICE — DRESSING, GAUZE, SPONGE, 12 PLY, 4 X 4 IN, PLASTIC POUCH, STRL 10PK

## (undated) DEVICE — SPONGE GAUZE, XRAY SC+RFID, 4X4 16 PLY, STERILE

## (undated) DEVICE — Device

## (undated) DEVICE — HOLSTER, JET SAFETY

## (undated) DEVICE — SYRINGE, MONOJECT, LUER LOCK, 3 CC, LF

## (undated) DEVICE — GLOVE, SURGICAL, PROTEXIS PI BLUE W/NEUTHERA, 7.5, PF, LF

## (undated) DEVICE — APPLICATOR, COTTON TIP, 3 IN, WOOD, 10-PACK, STERILE

## (undated) DEVICE — CLEANER, WIPE, INSTRUMENT, 3.25 X 3.25 IN

## (undated) DEVICE — ADHESIVE, SKIN, MASTISOL, 2/3 CC VIAL

## (undated) DEVICE — SUTURE, CHROMIC GUT 5/0  18  P-13"

## (undated) DEVICE — MARKER, SKIN, REGULAR TIP, W/W/FLEXI RULER, LABEL

## (undated) DEVICE — ARIS COBLATION TURBINATE REDUCTION WAND

## (undated) DEVICE — SYRINGE, 10 CC, LUER LOCK

## (undated) DEVICE — TUBING, SUCTION, 3/16 X 1/16 IN

## (undated) DEVICE — NEEDLE, HYPODERMIC, REGULAR WALL, REGULAR BEVEL, 18 G X 1.5 IN

## (undated) DEVICE — TOWEL, SURGICAL, NEURO, O/R, 16 X 26, BLUE, STERILE